# Patient Record
Sex: FEMALE | Race: WHITE | URBAN - METROPOLITAN AREA
[De-identification: names, ages, dates, MRNs, and addresses within clinical notes are randomized per-mention and may not be internally consistent; named-entity substitution may affect disease eponyms.]

---

## 2017-01-16 LAB — AMB DEXA, EXTERNAL: NORMAL

## 2018-03-28 LAB — MAMMOGRAPHY, EXTERNAL: NORMAL

## 2018-07-11 ENCOUNTER — OFFICE VISIT (OUTPATIENT)
Dept: FAMILY MEDICINE CLINIC | Age: 68
End: 2018-07-11

## 2018-07-11 VITALS
SYSTOLIC BLOOD PRESSURE: 122 MMHG | DIASTOLIC BLOOD PRESSURE: 72 MMHG | OXYGEN SATURATION: 96 % | WEIGHT: 151.6 LBS | HEIGHT: 66 IN | RESPIRATION RATE: 17 BRPM | BODY MASS INDEX: 24.36 KG/M2 | HEART RATE: 67 BPM | TEMPERATURE: 98 F

## 2018-07-11 DIAGNOSIS — E55.9 HYPOVITAMINOSIS D: ICD-10-CM

## 2018-07-11 DIAGNOSIS — C44.311 BASAL CELL CARCINOMA (BCC) OF SKIN OF NOSE: ICD-10-CM

## 2018-07-11 DIAGNOSIS — G20 PARKINSON DISEASE (HCC): Primary | Chronic | ICD-10-CM

## 2018-07-11 RX ORDER — CLONAZEPAM 0.5 MG/1
TABLET ORAL
COMMUNITY

## 2018-07-11 RX ORDER — FLUDROCORTISONE ACETATE 0.1 MG/1
0.2 TABLET ORAL DAILY
COMMUNITY
End: 2020-01-30 | Stop reason: ALTCHOICE

## 2018-07-11 RX ORDER — ONDANSETRON 4 MG/1
4 TABLET, FILM COATED ORAL
COMMUNITY
End: 2020-10-27 | Stop reason: ALTCHOICE

## 2018-07-11 RX ORDER — MIDODRINE HYDROCHLORIDE 10 MG/1
10 TABLET ORAL 3 TIMES DAILY
COMMUNITY
End: 2020-06-30 | Stop reason: DRUGHIGH

## 2018-07-11 RX ORDER — AMANTADINE HYDROCHLORIDE 100 MG/1
100 CAPSULE, GELATIN COATED ORAL 3 TIMES DAILY
COMMUNITY

## 2018-07-11 RX ORDER — ESCITALOPRAM OXALATE 10 MG/1
10 TABLET ORAL 2 TIMES DAILY
COMMUNITY
End: 2018-12-12

## 2018-07-11 RX ORDER — ASPIRIN 81 MG/1
TABLET ORAL DAILY
COMMUNITY
End: 2018-12-12

## 2018-07-11 RX ORDER — ERYTHROMYCIN 20 MG/G
GEL TOPICAL AS NEEDED
COMMUNITY
End: 2020-09-01 | Stop reason: ALTCHOICE

## 2018-07-11 RX ORDER — KETOCONAZOLE 20 MG/G
CREAM TOPICAL DAILY
COMMUNITY
End: 2020-09-01 | Stop reason: ALTCHOICE

## 2018-07-11 RX ORDER — ROPINIROLE 2 MG/1
3 TABLET, FILM COATED ORAL 3 TIMES DAILY
COMMUNITY

## 2018-07-11 NOTE — PATIENT INSTRUCTIONS

## 2018-07-11 NOTE — MR AVS SNAPSHOT
[address]  [address] [facility] [address] [phone] Patient: Iris Smith MRN: [id number] Visit Information Date & Time Provider Department Dept. Phone Encounter #  
 7/11/2018  9:30 AM Lety Jimenez [place] [phone] [id number] Upcoming Health Maintenance Date Due Hepatitis C Screening 1950 DTaP/Tdap/Td series (1 - Tdap) 9/8/1973 BREAST CANCER SCRN MAMMOGRAM 9/8/2002 FOBT Q 1 YEAR AGE 50-75 9/8/2002 ZOSTER VACCINE AGE 60> 7/8/2012 GLAUCOMA SCREENING Q2Y 9/8/2017 Bone Densitometry (Dexa) Screening 9/8/2017 Pneumococcal 65+ Low/Medium Risk (1 of 2 - PCV13) 9/8/2017 Influenza Age 5 to Adult 8/1/2018 Allergies as of 7/11/2018  Review Complete On: 7/11/2018 By: Nemo Alonso LPN Severity Noted Reaction Type Reactions Buspirone  07/11/2018    Rash Current Immunizations  Never Reviewed No immunizations on file. Not reviewed this visit You Were Diagnosed With   
  
 Codes Comments [facility] disease ([facility]    -  Primary ICD-10-CM: G20 
ICD-9-CM: 332.0 Basal cell carcinoma (BCC) of skin of nose     ICD-10-CM: C44.311 ICD-9-CM: 173.31 Hypovitaminosis D     ICD-10-CM: E55.9 ICD-9-CM: 268.9 Vitals BP Pulse Temp Resp Height(growth percentile) Weight(growth percentile) 122/72 (BP 1 Location: Left arm, BP Patient Position: Sitting) 67 98 °F (36.7 °C) (Oral) 17 5' 6\" (1.676 m) 151 lb 9.6 oz (68.8 kg) SpO2 BMI OB Status Smoking Status 96% 24.47 kg/m2 Hysterectomy Former Smoker BMI and BSA Data Body Mass Index Body Surface Area  
 24.47 kg/m 2 1.79 m 2 Preferred Pharmacy Pharmacy Name Phone [facility] [address] [facility], [facility] [place] [phone] Your Updated Medication List  
  
   
 This list is accurate as of 7/11/18 10:26 AM.  Always use your most recent med list.  
  
  
  
  
 ALIGN 4 mg Cap Generic drug:  Bifidobacterium Infantis Take  by mouth daily. amantadine HCl 100 mg capsule Commonly known as:  Corey Alberta Take 100 mg by mouth three (3) times daily. aspirin delayed-release 81 mg tablet Take  by mouth daily. clonazePAM 0.5 mg tablet Commonly known as:  Elta Halsted Take  by mouth nightly as needed. erythromycin with ethanol 2 % topical gel Commonly known as:  ERYGEL Apply  to affected area as needed. escitalopram oxalate 10 mg tablet Commonly known as:  Arther Putty Take 10 mg by mouth two (2) times a day. fludrocortisone 0.1 mg tablet Commonly known as:  FLORINEF Take  by mouth daily. ketoconazole 2 % topical cream  
Commonly known as:  NIZORAL Apply  to affected area daily. midodrine 10 mg tablet Commonly known as:  Rickie Croon Take  by mouth three (3) times daily. rOPINIRole 2 mg tablet Commonly known as:  [place] Peat Take 2 mg by mouth three (3) times daily. ZOFRAN 4 mg tablet Generic drug:  ondansetron hcl Take 4 mg by mouth every eight (8) hours as needed for Nausea. We Performed the Following  DEXA SCAN [facility] Comments: This external order was created through the Results Console.  MAMMOGRAPHY [[facility] Custom] Comments: This external order was created through the Results Console. Patient Instructions Learning About Vitamin D Why is it important to get enough vitamin D? Your body needs vitamin D to absorb calcium. Calcium keeps your bones and muscles, including your heart, healthy and strong. If your muscles don't get enough calcium, they can cramp, hurt, or feel weak. You may have long-term (chronic) muscle aches and pains.  
If you don't get enough vitamin D throughout life, you have an increased chance of having thin and brittle bones (osteoporosis) in your later years. Children who don't get enough vitamin D may not grow as much as others their age. They also have a chance of getting a rare disease called rickets. It causes weak bones. Vitamin D and calcium are added to many foods. And your body uses sunshine to make its own vitamin D. How much vitamin D do you need? The [employer] recommends that people ages 3 through 79 get 600 IU (international units) every day. Adults 71 and older need 800 IU every day. Blood tests for vitamin D can check your vitamin D level. But there is no standard normal range used by all laboratories. The [employer] recommends a blood level of 20 ng/mL of vitamin D for healthy bones. And most people in the [place] and [facility]) meet this goal. 
How can you get more vitamin D? Foods that contain vitamin D include: 
· [place], tuna, and mackerel. These are some of the best foods to eat when you need to get more vitamin D. 
· Cheese, egg yolks, and beef liver. These foods have vitamin D in small amounts. · Milk, soy drinks, orange juice, yogurt, margarine, and some kinds of cereal have vitamin D added to them. Some people don't make vitamin D as well as others. They may have to take extra care in getting enough vitamin D. Things that reduce how much vitamin D your body makes include: · Dark skin, such as many  Americans have. · Age, especially if you are older than 72. · Digestive problems, such as Crohn's or celiac disease. · Liver and kidney disease. Some people who do not get enough vitamin D may need supplements. Are there any risks from taking vitamin D? 
· Too much vitamin D: 
¨ Can damage your kidneys. ¨ Can cause nausea and vomiting, constipation, and weakness. ¨ Raises the amount of calcium in your blood. If this happens, you can get confused or have an irregular heart rhythm. · Vitamin D may interact with other medicines. Tell your doctor about all of the medicines you take, including over-the-counter drugs, herbs, and pills. Tell your doctor about all of your current medical problems. Where can you learn more? Go to http://alberto.info/. Enter [id number] in the search box to learn more about \"Learning About Vitamin D.\" 
Current as of: May 12, 2017 Content Version: 11.7 © 3057-8837 [employer]. Care instructions adapted under license by [employer] (which disclaims liability or warranty for this information). If you have questions about a medical condition or this instruction, always ask your healthcare professional. [address] any warranty or liability for your use of this information. Introducing [facility] [place] [facility]! Mervin Mandel introduces Proviation patient portal. Now you can access parts of your medical record, email your doctor's office, and request medication refills online. 1. In your internet browser, go to [email or web address] 2. Click on the First Time User? Click Here link in the Sign In box. You will see the New Member Sign Up page. 3. Enter your Proviation Access Code exactly as it appears below. You will not need to use this code after youve completed the sign-up process. If you do not sign up before the expiration date, you must request a new code. · Proviation Access Code: [email or web address] Expires: 10/9/2018 10:26 AM 
 
4. Enter the last four digits of your Social Security Number (xxxx) and Date of Birth (mm/dd/yyyy) as indicated and click Submit. You will be taken to the next sign-up page. 5. Create a Proviation ID. This will be your Proviation login ID and cannot be changed, so think of one that is secure and easy to remember. 6. Create a Proviation password. You can change your password at any time. 7. Enter your Password Reset Question and Answer.  This can be used at a later time if you forget your password. 8. Enter your e-mail address. You will receive e-mail notification when new information is available in [address] 9. Click Sign Up. You can now view and download portions of your medical record. 10. Click the Download Summary menu link to download a portable copy of your medical information. If you have questions, please visit the Frequently Asked Questions section of the Bean[employer] website. Remember, Bean[employer] is NOT to be used for urgent needs. For medical emergencies, dial 911. Now available from your iPhone and Android! Please provide this summary of care documentation to your next provider. Your primary care clinician is listed as Patience 51. If you have any questions after today's visit, please call [phone].

## 2018-07-11 NOTE — PROGRESS NOTES
Iris Smith is a 74 y.o. female (: 1950) presenting to address:    No chief complaint on file. Vitals:    18 0944   BP: 122/72   Pulse: 67   Resp: 17   Temp: 98 °F (36.7 °C)   TempSrc: Oral   SpO2: 96%   Weight: 151 lb 9.6 oz (68.8 kg)   Height: 5' 6\" (1.676 m)   PainSc:   0 - No pain       Hearing/Vision:      Visual Acuity Screening    Right eye Left eye Both eyes   Without correction: 20/50 20/70 20/50   With correction:          Learning Assessment:   No flowsheet data found. Depression Screening:     PHQ over the last two weeks 2018   Little interest or pleasure in doing things More than half the days   Feeling down, depressed or hopeless Several days   Total Score PHQ 2 3     Fall Risk Assessment:     Fall Risk Assessment, last 12 mths 2018   Able to walk? Yes   Fall in past 12 months? No     Abuse Screening:     Abuse Screening Questionnaire 2018   Do you ever feel afraid of your partner? N   Are you in a relationship with someone who physically or mentally threatens you? N   Is it safe for you to go home? Y     Coordination of Care Questionaire:   1. Have you been to the ER, urgent care clinic since your last visit? Hospitalized since your last visit? NO    2. Have you seen or consulted any other health care providers outside of the [facility] since your last visit? Include any pap smears or colon screening. NO    Advanced Directive:   1. Do you have an Advanced Directive? NO    2. Would you like information on Advanced Directives?  NO

## 2018-07-11 NOTE — PROGRESS NOTES
Assessment/Plan:    *Diagnoses and all orders for this visit:    1. Parkinson disease ([address]    2. Basal cell carcinoma (BCC) of skin of nose    3. Hypovitaminosis D        She will return next spring for her annual PE. Will come sooner if needed. Reminded her of Flu vaccines in Sept.    The plan was discussed with the patient. The patient verbalized understanding and is in agreement with the plan. All medication potential side effects were discussed with the patient.    -------------------------------------------------------------------------------------------------------------------        Alpha More is a 74 y.o. female and presents with Establish Care         Subjective:  Pt here to establish with a new PCP. Was previously seeing Dr. Jana Vargas at [facility]. Hx of parkinson disease, has seen Dr. Josi Wade for many years. The Requip, Amantadine, Lexapro, Klonopin, midodrine and fludrocortisone are all prescribed by him. Her BP has typically been so low that he added midodrine and fludrocortisone. Has issues with depression and anxiety so thus the lexapro and klonopin. Dx this year with basal cell cc. Had Mohs procedure. Had labs done earlier this year. Vit D was low. Has started an OTC supplement. ROS:  Constitutional: No recent weight change. No weakness/fatigue. No f/c. Skin: No rashes, change in nails/hair, itching   HENT: No HA, dizziness. No hearing loss/tinnitus. No nasal congestion/discharge. Eyes: No change in vision, double/blurred vision or eye pain/redness. Cardiovascular: No CP/palpitations. No BANKS/orthopnea/PND. Respiratory: No cough/sputum, dyspnea, wheezing. Gastointestinal: No dysphagia, reflux. No n/v. No constipation/diarrhea. No melena/rectal bleeding. Genitourinary: No dysuria, urinary hesitancy, nocturia, hematuria. No incontinence. Musculoskeletal: No joint pain/stiffness. No muscle pain/tenderness.    Endo: No heat/cold intolerance, no polyuria/polydypsia. Heme: No h/o anemia. No easy bleeding/bruising. Allergy/Immunology: No seasonal rhinitis. Denies frequent colds, sinus/ear infections. Neurological: No seizures/numbness/weakness. No paresthesias. Psychiatric:  No depression, anxiety. The problem list was updated as a part of today's visit. Patient Active Problem List   Diagnosis Code    Parkinson disease ([facility] G20    Basal cell carcinoma (BCC) of skin of nose C44.311    Hypovitaminosis D E55.9       The PSH, FH were reviewed. SH:  Social History   Substance Use Topics    Smoking status: Former Smoker     Types: Cigarettes     Quit date: 7/1/1998    Smokeless tobacco: Never Used    Alcohol use No       Medications/Allergies:  No current outpatient prescriptions on file prior to visit. No current facility-administered medications on file prior to visit. Allergies   Allergen Reactions    Buspirone Rash         Health Maintenance:   Health Maintenance   Topic Date Due    Hepatitis C Screening  1950    DTaP/Tdap/Td series (1 - Tdap) 09/08/1973    FOBT Q 1 YEAR AGE 50-75  09/08/2002    ZOSTER VACCINE AGE 60>  07/08/2012    GLAUCOMA SCREENING Q2Y  09/08/2017    Pneumococcal 65+ Low/Medium Risk (1 of 2 - PCV13) 09/08/2017    MEDICARE YEARLY EXAM  07/11/2018    Influenza Age 5 to Adult  08/01/2018    BREAST CANCER SCRN MAMMOGRAM  03/28/2020    Bone Densitometry (Dexa) Screening  Completed       Objective:  Visit Vitals    /72 (BP 1 Location: Left arm, BP Patient Position: Sitting)    Pulse 67    Temp 98 °F (36.7 °C) (Oral)    Resp 17    Ht 5' 6\" (1.676 m)    Wt 151 lb 9.6 oz (68.8 kg)    SpO2 96%    BMI 24.47 kg/m2          Nurses notes and VS reviewed.       Physical Examination: General appearance - alert, well appearing, and in no distress  Chest - clear to auscultation, no wheezes, rales or rhonchi, symmetric air entry  Heart - normal rate, regular rhythm, normal S1, S2, no murmurs, rubs, clicks or gallops  Abdomen - soft, nontender, nondistended, no masses or organomegaly  Neuro - resting tremors        Labwork and Ancillary Studies:    CBC w/Diff  No results found for: WBC, WBCLT, HGB, HGBP, PLT, HGBEXT, PLTEXT      Basic Metabolic Profile  No results found for: NA, K, CL, CO2, AGAP, GLU, BUN, CREA, BUCR, GFRAA, GFRNA, CA, GFRAA      LFT  No results found for: GPT, ALT, SGOT, GGT, GGTP, AP, APIT, APX, CBIL, TBIL, TBILI      Cholesterol  No results found for: CHOL, CHOLX, CHLST, CHOLV, HDL, LDL, LDLC, DLDLP, TGLX, TRIGL, TRIGP, CHHD, CHHDX

## 2018-11-08 ENCOUNTER — OFFICE VISIT (OUTPATIENT)
Dept: FAMILY MEDICINE CLINIC | Age: 68
End: 2018-11-08

## 2018-11-08 VITALS
OXYGEN SATURATION: 97 % | RESPIRATION RATE: 18 BRPM | HEART RATE: 68 BPM | SYSTOLIC BLOOD PRESSURE: 108 MMHG | DIASTOLIC BLOOD PRESSURE: 60 MMHG | BODY MASS INDEX: 24.46 KG/M2 | WEIGHT: 152.2 LBS | HEIGHT: 66 IN | TEMPERATURE: 97.7 F

## 2018-11-08 DIAGNOSIS — M25.521 RIGHT ELBOW PAIN: Primary | ICD-10-CM

## 2018-11-08 RX ORDER — PYRIDOSTIGMINE BROMIDE 30 MG/1
30 TABLET ORAL 3 TIMES DAILY
COMMUNITY

## 2018-11-08 NOTE — PROGRESS NOTES
Assessment/Plan:    *Diagnoses and all orders for this visit:    1. Right elbow pain  -     XR ELBOW RT MIN 3 V; Future      Her stopping the ASA may be why she is feeling inflammation in her body like this issue. Will await Xray results. Pt will continue to monitor it for now since the pain is intermittent. Suggested she take some stress off that arm in her daily activities. Will contact me if pain gets worse. Suspecting tendinitis and will refer to Sports Med if needed. For now, she will use Ibuprofen as needed. She does not want to take it every day. The plan was discussed with the patient. The patient verbalized understanding and is in agreement with the plan. All medication potential side effects were discussed with the patient.    -------------------------------------------------------------------------------------------------------------------        Isha Rodriguez is a 79 y.o. female and presents with Elbow Pain (Right)         Subjective:  Pt here for RT elbow pain that is intermittent issue, 4 weeks now. No preceding trauma, no past issue with the elbow, is left hand dominant. Has not noticed restrictions in movement or in her activities. When thinking about this, she feels she may be using this arm a lot more than she has typically done in the past and may have strained something. Also of note, she stopped using her daily ASA about 2-3 weeks ago. ROS:  Review of Systems - Negative except as above. The problem list was updated as a part of today's visit. Patient Active Problem List   Diagnosis Code    Parkinson disease ([facility] G20    Basal cell carcinoma (BCC) of skin of nose C44.311    Hypovitaminosis D E55.9       The PSH, FH were reviewed.         SH:  Social History     Tobacco Use    Smoking status: Former Smoker     Types: Cigarettes     Last attempt to quit: 1998     Years since quittin.3    Smokeless tobacco: Never Used   Substance Use Topics    Alcohol use: No    Drug use: No       Medications/Allergies:  Current Outpatient Medications on File Prior to Visit   Medication Sig Dispense Refill    pyridostigmine (MESTINON) 60 mg tablet Take 30 mg by mouth four (4) times daily.  escitalopram oxalate (LEXAPRO) 10 mg tablet Take 10 mg by mouth two (2) times a day.  clonazePAM (KLONOPIN) 0.5 mg tablet Take  by mouth nightly as needed.  amantadine HCl (SYMMETREL) 100 mg capsule Take 100 mg by mouth three (3) times daily.  rOPINIRole (REQUIP) 2 mg tablet Take 2 mg by mouth three (3) times daily.  midodrine (PROAMITINE) 10 mg tablet Take  by mouth three (3) times daily.  fludrocortisone (FLORINEF) 0.1 mg tablet Take 0.2 mg by mouth daily.  Bifidobacterium Infantis (ALIGN) 4 mg cap Take  by mouth daily.  ondansetron hcl (ZOFRAN) 4 mg tablet Take 4 mg by mouth every eight (8) hours as needed for Nausea.  erythromycin with ethanol (ERYGEL) 2 % topical gel Apply  to affected area as needed.  ketoconazole (NIZORAL) 2 % topical cream Apply  to affected area daily.  aspirin delayed-release 81 mg tablet Take  by mouth daily. No current facility-administered medications on file prior to visit.          Allergies   Allergen Reactions    Buspirone Rash    Wellbutrin [Bupropion Hcl] Rash and Itching         Health Maintenance:   Health Maintenance   Topic Date Due    Hepatitis C Screening  1950    DTaP/Tdap/Td series (1 - Tdap) 09/08/1973    Shingrix Vaccine Age 50> (1 of 2) 09/08/2002    GLAUCOMA SCREENING Q2Y  09/08/2017    MEDICARE YEARLY EXAM  07/11/2018    BREAST CANCER SCRN MAMMOGRAM  03/28/2020    COLONOSCOPY  10/20/2020    Pneumococcal 65+ Low/Medium Risk (2 of 2 - PPSV23) 12/23/2021    Bone Densitometry (Dexa) Screening  Completed    Influenza Age 9 to Adult  Completed       Objective:  Visit Vitals  /60 (BP 1 Location: Left arm, BP Patient Position: Sitting)   Pulse 68   Temp 97.7 °F (36.5 °C) (Oral)   Resp 18   Ht 5' 6\" (1.676 m)   Wt 152 lb 3.2 oz (69 kg)   SpO2 97%   BMI 24.57 kg/m²          Nurses notes and VS reviewed. Physical Examination: General appearance - alert, well appearing, and in no distress  Musculoskeletal - no joint tenderness, deformity or swelling, no pain with flexion or extension.         Labwork and Ancillary Studies:    CBC w/Diff  No results found for: WBC, WBCLT, HGB, HGBP, PLT, HGBEXT, PLTEXT, HGBEXT, PLTEXT      Basic Metabolic Profile  No results found for: NA, K, CL, CO2, AGAP, GLU, BUN, CREA, BUCR, GFRAA, GFRNA, CA, GFRAA      LFT  No results found for: GPT, ALT, SGOT, GGT, GGTP, AP, APIT, APX, CBIL, TBIL, TBILI      Cholesterol  No results found for: CHOL, CHOLX, CHLST, CHOLV, HDL, LDL, LDLC, DLDLP, TGLX, TRIGL, TRIGP, CHHD, CHHDX

## 2018-11-08 NOTE — PROGRESS NOTES
Itzel Anthony is a 79 y.o. female (: 1950) presenting to address: Has been experiencing right elbow pain for the past 3 to 4 weeks. She has tried using Ibuprofen for the pain, with moderate relief. Patient has already received the flu vaccine. Chief Complaint   Patient presents with    Elbow Pain     Right       Vitals:    18 1102   BP: 108/60   Pulse: 68   Resp: 18   Temp: 97.7 °F (36.5 °C)   TempSrc: Oral   SpO2: 97%   Weight: 152 lb 3.2 oz (69 kg)   Height: 5' 6\" (1.676 m)   PainSc:   0 - No pain       Hearing/Vision:   No exam data present    Learning Assessment:   No flowsheet data found. Depression Screening:     PHQ over the last two weeks 2018   Little interest or pleasure in doing things More than half the days   Feeling down, depressed, irritable, or hopeless Several days   Total Score PHQ 2 3     Fall Risk Assessment:     Fall Risk Assessment, last 12 mths 2018   Able to walk? Yes   Fall in past 12 months? No     Abuse Screening:     Abuse Screening Questionnaire 2018   Do you ever feel afraid of your partner? N   Are you in a relationship with someone who physically or mentally threatens you? N   Is it safe for you to go home? Y     Coordination of Care Questionaire:   1. Have you been to the ER, urgent care clinic since your last visit? Hospitalized since your last visit? NO    2. Have you seen or consulted any other health care providers outside of the [address] [place] [address] since your last visit? Include any pap smears or colon screening. YES Neurologist on 18; Dermatologist not sure exactly when. Advanced Directive:   1. Do you have an Advanced Directive? YES    2. Would you like information on Advanced Directives?  NO

## 2018-11-08 NOTE — PATIENT INSTRUCTIONS
Elbow: Exercises  Your Care Instructions  Here are some examples of exercises for elbows. Start each exercise slowly. Ease off the exercise if you start to have pain. Your doctor or physical therapist will tell you when you can start these exercises and which ones will work best for you. How to do the exercises  Wrist flexor stretch    1. Extend your arm in front of you with your palm up. 2. Bend your wrist, pointing your hand toward the floor. 3. With your other hand, gently bend your wrist farther until you feel a mild to moderate stretch in your forearm. 4. Hold for at least 15 to 30 seconds. Repeat 2 to 4 times. Wrist extensor stretch    1. Repeat steps 1 to 4 of the stretch above but begin with your extended hand palm down. Ball or sock squeeze    1. Hold a tennis ball (or a rolled-up sock) in your hand. 2. Make a fist around the ball (or sock) and squeeze. 3. Hold for about 6 seconds, and then relax for up to 10 seconds. 4. Repeat 8 to 12 times. 5. Switch the ball (or sock) to your other hand and do 8 to 12 times. Wrist deviation    1. Sit so that your arm is supported but your hand hangs off the edge of a flat surface, such as a table. 2. Hold your hand out like you are shaking hands with someone. 3. Move your hand up and down. 4. Repeat this motion 8 to 12 times. 5. Switch arms. 6. Try to do this exercise twice with each hand. Wrist curls    1. Place your forearm on a table with your hand hanging over the edge of the table, palm up. 2. Place a 1- to 2-pound weight in your hand. This may be a dumbbell, a can of food, or a filled water bottle. 3. Slowly raise and lower the weight while keeping your forearm on the table and your palm facing up. 4. Repeat this motion 8 to 12 times. 5. Switch arms, and do steps 1 through 4.  6. Repeat with your hand facing down toward the floor. Switch arms. Biceps curls    1.  Sit leaning forward with your legs slightly spread and your left hand on your left thigh. 2. Place your right elbow on your right thigh, and hold the weight with your forearm horizontal.  3. Slowly curl the weight up and toward your chest.  4. Repeat this motion 8 to 12 times. 5. Switch arms, and do steps 1 through 4. Follow-up care is a key part of your treatment and safety. Be sure to make and go to all appointments, and call your doctor if you are having problems. It's also a good idea to know your test results and keep a list of the medicines you take. Where can you learn more? Go to http://heidy-daniel.info/. Enter M279 in the search box to learn more about \"Elbow: Exercises. \"  Current as of: November 29, 2017  Content Version: 11.8  © 1204-0718 [employer]. Care instructions adapted under license by [employer] (which disclaims liability or warranty for this information). If you have questions about a medical condition or this instruction, always ask your healthcare professional. [address] any warranty or liability for your use of this information.

## 2018-12-12 ENCOUNTER — OFFICE VISIT (OUTPATIENT)
Dept: FAMILY MEDICINE CLINIC | Age: 68
End: 2018-12-12

## 2018-12-12 VITALS
DIASTOLIC BLOOD PRESSURE: 70 MMHG | RESPIRATION RATE: 16 BRPM | WEIGHT: 154 LBS | BODY MASS INDEX: 24.75 KG/M2 | SYSTOLIC BLOOD PRESSURE: 136 MMHG | HEIGHT: 66 IN | HEART RATE: 64 BPM | TEMPERATURE: 97.9 F | OXYGEN SATURATION: 97 %

## 2018-12-12 DIAGNOSIS — Z78.0 POSTMENOPAUSAL: ICD-10-CM

## 2018-12-12 DIAGNOSIS — Z13.6 SCREENING FOR HYPERTENSION: ICD-10-CM

## 2018-12-12 DIAGNOSIS — Z00.00 MEDICARE ANNUAL WELLNESS VISIT, SUBSEQUENT: Primary | ICD-10-CM

## 2018-12-12 DIAGNOSIS — R53.83 FATIGUE, UNSPECIFIED TYPE: ICD-10-CM

## 2018-12-12 DIAGNOSIS — G20 PARKINSON DISEASE (HCC): Chronic | ICD-10-CM

## 2018-12-12 DIAGNOSIS — Z12.31 ENCOUNTER FOR SCREENING MAMMOGRAM FOR MALIGNANT NEOPLASM OF BREAST: ICD-10-CM

## 2018-12-12 DIAGNOSIS — E55.9 HYPOVITAMINOSIS D: ICD-10-CM

## 2018-12-12 RX ORDER — CITALOPRAM 20 MG/1
10 TABLET, FILM COATED ORAL DAILY
COMMUNITY

## 2018-12-12 NOTE — PATIENT INSTRUCTIONS
Medicare Wellness Visit, Female     The best way to live healthy is to have a lifestyle where you eat a well-balanced diet, exercise regularly, limit alcohol use, and quit all forms of tobacco/nicotine, if applicable. Regular preventive services are another way to keep healthy. Preventive services (vaccines, screening tests, monitoring & exams) can help personalize your care plan, which helps you manage your own care. Screening tests can find health problems at the earliest stages, when they are easiest to treat. [facility] follows the current, evidence-based guidelines published by the [facility] Jenny ([facility]) when recommending preventive services for our patients. Because we follow these guidelines, sometimes recommendations change over time as research supports it. (For example, mammograms used to be recommended annually. Even though Medicare will still pay for an annual mammogram, the newer guidelines recommend a mammogram every two years for women of average risk.)  Of course, you and your doctor may decide to screen more often for some diseases, based on your risk and your health status. Preventive services for you include:  - Medicare offers their members a free annual wellness visit, which is time for you and your primary care provider to discuss and plan for your preventive service needs. Take advantage of this benefit every year!  -All adults over the age of 72 should receive the recommended pneumonia vaccines. Current USPSTF guidelines recommend a series of two vaccines for the best pneumonia protection.   -All adults should have a flu vaccine yearly and a tetanus vaccine every 10 years. All adults age 61 and older should receive a shingles vaccine once in their lifetime.    -A bone mass density test is recommended when a woman turns 65 to screen for osteoporosis. This test is only recommended one time, as a screening.  Some providers will use this same test as a disease monitoring tool if you already have osteoporosis. -All adults age 38-68 who are overweight should have a diabetes screening test once every three years.   -Other screening tests and preventive services for persons with diabetes include: an eye exam to screen for diabetic retinopathy, a kidney function test, a foot exam, and stricter control over your cholesterol.   -Cardiovascular screening for adults with routine risk involves an electrocardiogram (ECG) at intervals determined by your doctor.   -Colorectal cancer screenings should be done for adults age 54-65 with no increased risk factors for colorectal cancer. There are a number of acceptable methods of screening for this type of cancer. Each test has its own benefits and drawbacks. Discuss with your doctor what is most appropriate for you during your annual wellness visit. The different tests include: colonoscopy (considered the best screening method), a fecal occult blood test, a fecal DNA test, and sigmoidoscopy. -Breast cancer screenings are recommended every other year for women of normal risk, age 54-69.  -Cervical cancer screenings for women over age 72 are only recommended with certain risk factors.   -All adults born between [facility] should be screened once for Hepatitis C.      Here is a list of your current Health Maintenance items (your personalized list of preventive services) with a due date:  Health Maintenance Due   Topic Date Due    Hepatitis C Test  1950    DTaP/Tdap/Td  (1 - Tdap) 09/08/1973    Shingles Vaccine (1 of 2) 09/08/2002    Annual Well Visit  07/11/2018

## 2018-12-12 NOTE — PROGRESS NOTES
This is the Subsequent Medicare Annual Wellness Exam, performed 12 months or more after the Initial AWV or the last Subsequent AWV    I have reviewed the patient's medical history in detail and updated the computerized patient record. History     Past Medical History:   Diagnosis Date    Basal cell carcinoma (BCC) of skin of nose 2018    Hypovitaminosis D 2018    Parkinson disease ([facility] [address] 2014      No past surgical history on file. Current Outpatient Medications   Medication Sig Dispense Refill    citalopram (CELEXA) 20 mg tablet Take  by mouth daily.  pyridostigmine (MESTINON) 60 mg tablet Take 30 mg by mouth four (4) times daily.  clonazePAM (KLONOPIN) 0.5 mg tablet Take  by mouth nightly as needed.  amantadine HCl (SYMMETREL) 100 mg capsule Take 100 mg by mouth three (3) times daily.  rOPINIRole (REQUIP) 2 mg tablet Take 2 mg by mouth three (3) times daily.  midodrine (PROAMITINE) 10 mg tablet Take  by mouth three (3) times daily.  fludrocortisone (FLORINEF) 0.1 mg tablet Take 0.2 mg by mouth daily.  ondansetron hcl (ZOFRAN) 4 mg tablet Take 4 mg by mouth every eight (8) hours as needed for Nausea.  erythromycin with ethanol (ERYGEL) 2 % topical gel Apply  to affected area as needed.  ketoconazole (NIZORAL) 2 % topical cream Apply  to affected area daily. Allergies   Allergen Reactions    Buspirone Rash    Wellbutrin [Bupropion Hcl] Rash and Itching     No family history on file.   Social History     Tobacco Use    Smoking status: Former Smoker     Types: Cigarettes     Last attempt to quit: 1998     Years since quittin.4    Smokeless tobacco: Never Used   Substance Use Topics    Alcohol use: No     Patient Active Problem List   Diagnosis Code    Parkinson disease ([facility] [address] G20    Basal cell carcinoma (BCC) of skin of nose C44.311    Hypovitaminosis D E55.9       Depression Risk Factor Screening:     PHQ over the last two weeks 12/12/2018   Little interest or pleasure in doing things Several days   Feeling down, depressed, irritable, or hopeless Several days   Total Score PHQ 2 2     Alcohol Risk Factor Screening: You do not drink alcohol or very rarely. Functional Ability and Level of Safety:   Hearing Loss  Hearing is good. Activities of Daily Living  The home contains: no safety equipment. Patient does total self care    Fall Risk  Fall Risk Assessment, last 12 mths 12/12/2018   Able to walk? Yes   Fall in past 12 months? Yes   Fall with injury? No   Number of falls in past 12 months 1   Fall Risk Score 1       Abuse Screen  Patient is not abused    Cognitive Screening   Evaluation of Cognitive Function:  Has your family/caregiver stated any concerns about your memory: no  Normal    Patient Care Team   Patient Care Team:  Katerin James MD as PCP - General (Internal Medicine)  Valentina Corcoran MD as Physician (Neurology)    Assessment/Plan   Education and counseling provided:  Are appropriate based on today's review and evaluation    Diagnoses and all orders for this visit:    1. Medicare annual wellness visit, subsequent    2. Postmenopausal  -     DEXA BONE DENSITY STUDY AXIAL; Future    3. Encounter for screening mammogram for malignant neoplasm of breast  -     THERESA MAMMO BI SCREENING INCL CAD; Future    4. Hypovitaminosis D  -     VITAMIN D, 25 HYDROXY; Future    5. Parkinson disease ([facility] [address]  -     CBC WITH AUTOMATED DIFF; Future  -     HEPATIC FUNCTION PANEL; Future  -     METABOLIC PANEL, BASIC; Future  -     URINALYSIS W/ RFLX MICROSCOPIC; Future    6. Screening for hypertension  -     LIPID PANEL; Future    7. Fatigue, unspecified type  -     TSH 3RD GENERATION; Future  -     T4, FREE;  Future        Health Maintenance Due   Topic Date Due    Hepatitis C Screening  1950    DTaP/Tdap/Td series (1 - Tdap) 09/08/1973    Shingrix Vaccine Age 50> (1 of 2) 09/08/2002    MEDICARE YEARLY EXAM  07/11/2018

## 2018-12-12 NOTE — PROGRESS NOTES
Crystal Thomson is a 79 y.o. female (: 1950) presenting to address:    Chief Complaint   Patient presents with    Annual Wellness Visit       Vitals:    18 1017   BP: 136/70   Pulse: 64   Resp: 16   Temp: 97.9 °F (36.6 °C)   TempSrc: Oral   SpO2: 97%   Weight: 154 lb (69.9 kg)   Height: 5' 6\" (1.676 m)   PainSc:   0 - No pain       Hearing/Vision:      Visual Acuity Screening    Right eye Left eye Both eyes   Without correction: 20/70 20/40 20/40   With correction:          Learning Assessment:   No flowsheet data found. Depression Screening:     PHQ over the last two weeks 2018   Little interest or pleasure in doing things Several days   Feeling down, depressed, irritable, or hopeless Several days   Total Score PHQ 2 2     Fall Risk Assessment:     Fall Risk Assessment, last 12 mths 2018   Able to walk? Yes   Fall in past 12 months? Yes   Fall with injury? No   Number of falls in past 12 months 1   Fall Risk Score 1     Abuse Screening:     Abuse Screening Questionnaire 2018   Do you ever feel afraid of your partner? N   Are you in a relationship with someone who physically or mentally threatens you? N   Is it safe for you to go home? Y     Coordination of Care Questionaire:   1. Have you been to the ER, urgent care clinic since your last visit? Hospitalized since your last visit? NO    2. Have you seen or consulted any other health care providers outside of the [address] [place] [address] since your last visit? Include any pap smears or colon screening. YES dermatology, neurology    Advanced Directive:   1. Do you have an Advanced Directive? NO    2. Would you like information on Advanced Directives?  NO

## 2018-12-13 ENCOUNTER — HOSPITAL ENCOUNTER (OUTPATIENT)
Dept: LAB | Age: 68
Discharge: HOME | End: 2018-12-13
Payer: MEDICARE

## 2018-12-13 DIAGNOSIS — G20 PARKINSON DISEASE (HCC): Chronic | ICD-10-CM

## 2018-12-13 DIAGNOSIS — R53.83 FATIGUE, UNSPECIFIED TYPE: ICD-10-CM

## 2018-12-13 DIAGNOSIS — Z13.6 SCREENING FOR HYPERTENSION: ICD-10-CM

## 2018-12-13 DIAGNOSIS — E55.9 HYPOVITAMINOSIS D: ICD-10-CM

## 2018-12-13 LAB
ALBUMIN SERPL-MCNC: 4.2 G/DL (ref 3.4–5)
ALBUMIN/GLOB SERPL: 1.6 {RATIO} (ref 0.8–1.7)
ALP SERPL-CCNC: 102 U/L (ref 45–117)
ALT SERPL-CCNC: 31 U/L (ref 13–56)
ANION GAP SERPL CALC-SCNC: 6 MMOL/L (ref 3–18)
APPEARANCE UR: CLEAR
AST SERPL-CCNC: 16 U/L (ref 15–37)
BASOPHILS # BLD: 0 K/UL (ref 0–0.1)
BASOPHILS NFR BLD: 1 % (ref 0–2)
BILIRUB DIRECT SERPL-MCNC: 0.2 MG/DL (ref 0–0.2)
BILIRUB SERPL-MCNC: 0.8 MG/DL (ref 0.2–1)
BILIRUB UR QL: NEGATIVE
BUN SERPL-MCNC: 10 MG/DL (ref 7–18)
BUN/CREAT SERPL: 16 (ref 12–20)
CALCIUM SERPL-MCNC: 8.9 MG/DL (ref 8.5–10.1)
CHLORIDE SERPL-SCNC: 107 MMOL/L (ref 100–108)
CHOLEST SERPL-MCNC: 170 MG/DL
CO2 SERPL-SCNC: 29 MMOL/L (ref 21–32)
COLOR UR: YELLOW
CREAT SERPL-MCNC: 0.62 MG/DL (ref 0.6–1.3)
DIFFERENTIAL METHOD BLD: ABNORMAL
EOSINOPHIL # BLD: 0.1 K/UL (ref 0–0.4)
EOSINOPHIL NFR BLD: 1 % (ref 0–5)
ERYTHROCYTE [DISTWIDTH] IN BLOOD BY AUTOMATED COUNT: 13.4 % (ref 11.6–14.5)
GLOBULIN SER CALC-MCNC: 2.6 G/DL (ref 2–4)
GLUCOSE SERPL-MCNC: 81 MG/DL (ref 74–99)
GLUCOSE UR STRIP.AUTO-MCNC: NEGATIVE MG/DL
HCT VFR BLD AUTO: 40.7 % (ref 35–45)
HDLC SERPL-MCNC: 75 MG/DL (ref 40–60)
HDLC SERPL: 2.3 {RATIO} (ref 0–5)
HGB BLD-MCNC: 12.7 G/DL (ref 12–16)
HGB UR QL STRIP: NEGATIVE
KETONES UR QL STRIP.AUTO: NEGATIVE MG/DL
LDLC SERPL CALC-MCNC: 85 MG/DL (ref 0–100)
LEUKOCYTE ESTERASE UR QL STRIP.AUTO: NEGATIVE
LIPID PROFILE,FLP: ABNORMAL
LYMPHOCYTES # BLD: 1.2 K/UL (ref 0.9–3.6)
LYMPHOCYTES NFR BLD: 36 % (ref 21–52)
MCH RBC QN AUTO: 32.2 PG (ref 24–34)
MCHC RBC AUTO-ENTMCNC: 31.2 G/DL (ref 31–37)
MCV RBC AUTO: 103.3 FL (ref 74–97)
MONOCYTES # BLD: 0.3 K/UL (ref 0.05–1.2)
MONOCYTES NFR BLD: 9 % (ref 3–10)
NEUTS SEG # BLD: 1.8 K/UL (ref 1.8–8)
NEUTS SEG NFR BLD: 53 % (ref 40–73)
NITRITE UR QL STRIP.AUTO: NEGATIVE
PH UR STRIP: >8.5 [PH] (ref 5–8)
PLATELET # BLD AUTO: 176 K/UL (ref 135–420)
PMV BLD AUTO: 10.4 FL (ref 9.2–11.8)
POTASSIUM SERPL-SCNC: 3.8 MMOL/L (ref 3.5–5.5)
PROT SERPL-MCNC: 6.8 G/DL (ref 6.4–8.2)
PROT UR STRIP-MCNC: NEGATIVE MG/DL
RBC # BLD AUTO: 3.94 M/UL (ref 4.2–5.3)
SODIUM SERPL-SCNC: 142 MMOL/L (ref 136–145)
SP GR UR REFRACTOMETRY: 1.01 (ref 1–1.03)
T4 FREE SERPL-MCNC: 0.9 NG/DL (ref 0.7–1.5)
TRIGL SERPL-MCNC: 50 MG/DL (ref ?–150)
TSH SERPL DL<=0.05 MIU/L-ACNC: 2 UIU/ML (ref 0.36–3.74)
UROBILINOGEN UR QL STRIP.AUTO: 1 EU/DL (ref 0.2–1)
VLDLC SERPL CALC-MCNC: 10 MG/DL
WBC # BLD AUTO: 3.5 K/UL (ref 4.6–13.2)

## 2018-12-13 PROCEDURE — 80076 HEPATIC FUNCTION PANEL: CPT

## 2018-12-13 PROCEDURE — 82306 VITAMIN D 25 HYDROXY: CPT

## 2018-12-13 PROCEDURE — 84439 ASSAY OF FREE THYROXINE: CPT

## 2018-12-13 PROCEDURE — 81003 URINALYSIS AUTO W/O SCOPE: CPT

## 2018-12-13 PROCEDURE — 84443 ASSAY THYROID STIM HORMONE: CPT

## 2018-12-13 PROCEDURE — 80048 BASIC METABOLIC PNL TOTAL CA: CPT

## 2018-12-13 PROCEDURE — 85025 COMPLETE CBC W/AUTO DIFF WBC: CPT

## 2018-12-13 PROCEDURE — 80061 LIPID PANEL: CPT

## 2018-12-13 PROCEDURE — 36415 COLL VENOUS BLD VENIPUNCTURE: CPT

## 2018-12-14 LAB — 25(OH)D3 SERPL-MCNC: 22.5 NG/ML (ref 30–100)

## 2019-04-09 ENCOUNTER — TELEPHONE (OUTPATIENT)
Dept: FAMILY MEDICINE CLINIC | Age: 69
End: 2019-04-09

## 2019-04-10 RX ORDER — ALENDRONATE SODIUM 70 MG/1
70 TABLET ORAL
Qty: 12 TAB | Refills: 2 | Status: SHIPPED | OUTPATIENT
Start: 2019-04-10 | End: 2019-12-09 | Stop reason: SDUPTHER

## 2019-04-10 NOTE — TELEPHONE ENCOUNTER
Please notify pt that he DEXA shows osteoporosis. With this, I recommend we treat with Fosamax 70 mg q week. Will continue this for 2 years, then repeat DEXA to see how she is doing. Does she agree?

## 2019-04-15 DIAGNOSIS — Z78.0 POSTMENOPAUSAL: ICD-10-CM

## 2019-04-15 DIAGNOSIS — Z12.31 ENCOUNTER FOR SCREENING MAMMOGRAM FOR MALIGNANT NEOPLASM OF BREAST: ICD-10-CM

## 2019-05-29 ENCOUNTER — OFFICE VISIT (OUTPATIENT)
Dept: FAMILY MEDICINE CLINIC | Age: 69
End: 2019-05-29

## 2019-05-29 ENCOUNTER — HOSPITAL ENCOUNTER (OUTPATIENT)
Dept: LAB | Age: 69
Discharge: HOME | End: 2019-05-29
Payer: MEDICARE

## 2019-05-29 VITALS
DIASTOLIC BLOOD PRESSURE: 70 MMHG | TEMPERATURE: 97.5 F | BODY MASS INDEX: 25.39 KG/M2 | OXYGEN SATURATION: 96 % | SYSTOLIC BLOOD PRESSURE: 122 MMHG | HEART RATE: 60 BPM | RESPIRATION RATE: 16 BRPM | WEIGHT: 158 LBS | HEIGHT: 66 IN

## 2019-05-29 DIAGNOSIS — N30.00 ACUTE CYSTITIS WITHOUT HEMATURIA: ICD-10-CM

## 2019-05-29 DIAGNOSIS — N30.00 ACUTE CYSTITIS WITHOUT HEMATURIA: Primary | ICD-10-CM

## 2019-05-29 DIAGNOSIS — G20 PARKINSON DISEASE (HCC): ICD-10-CM

## 2019-05-29 DIAGNOSIS — N39.41 URGE INCONTINENCE OF URINE: ICD-10-CM

## 2019-05-29 DIAGNOSIS — R30.0 DYSURIA: ICD-10-CM

## 2019-05-29 LAB
BILIRUB UR QL STRIP: NEGATIVE
GLUCOSE UR-MCNC: NEGATIVE MG/DL
KETONES P FAST UR STRIP-MCNC: NEGATIVE MG/DL
PH UR STRIP: 6 [PH] (ref 4.6–8)
PROT UR QL STRIP: NEGATIVE
SP GR UR STRIP: 1.03 (ref 1–1.03)
UA UROBILINOGEN AMB POC: NORMAL (ref 0.2–1)
URINALYSIS CLARITY POC: CLEAR
URINALYSIS COLOR POC: YELLOW
URINE BLOOD POC: NEGATIVE
URINE LEUKOCYTES POC: NEGATIVE
URINE NITRITES POC: NEGATIVE

## 2019-05-29 PROCEDURE — 87186 SC STD MICRODIL/AGAR DIL: CPT

## 2019-05-29 PROCEDURE — 87077 CULTURE AEROBIC IDENTIFY: CPT

## 2019-05-29 PROCEDURE — 87086 URINE CULTURE/COLONY COUNT: CPT

## 2019-05-29 RX ORDER — NITROFURANTOIN 25; 75 MG/1; MG/1
100 CAPSULE ORAL 2 TIMES DAILY
Qty: 14 CAP | Refills: 0 | Status: SHIPPED | OUTPATIENT
Start: 2019-05-29 | End: 2019-06-05

## 2019-05-29 RX ORDER — GLUCOSAMINE SULFATE 1500 MG
POWDER IN PACKET (EA) ORAL DAILY
COMMUNITY

## 2019-05-29 NOTE — PATIENT INSTRUCTIONS
Urge Incontinence in Women: Care Instructions Your Care Instructions Urge incontinence occurs when the need to urinate is so strong that you cannot reach the toilet in time, even when your bladder contains only a small amount of urine. This is also called overactive bladder or unstable bladder. Some women may have no warning before they leak urine. This condition does not cause major health problems. But it can be embarrassing and can affect a woman's self-esteem and confidence. Treatment can cure or improve your symptoms. Follow-up care is a key part of your treatment and safety. Be sure to make and go to all appointments, and call your doctor if you are having problems. It's also a good idea to know your test results and keep a list of the medicines you take. How can you care for yourself at home? · Be safe with medicines. Take your medicines exactly as prescribed. Call your doctor if you think you are having a problem with your medicine. You will get more details on the specific medicines your doctor prescribes. · Limit caffeine and alcohol. They stimulate urine production. · Urinate every 2 to 4 hours during waking hours, even if you feel that you do not have to go. · Do pelvic floor (Kegel) exercises, which tighten and strengthen pelvic muscles. To do Kegel exercises: 
? Squeeze the same muscles you would use to stop your urine. Your belly and thighs should not move. ? Hold the squeeze for 3 seconds, then relax for 3 seconds. ? Start with 3 seconds. Then add 1 second each week until you are able to squeeze for 10 seconds. ? Repeat the exercise 10 to 15 times for each session. Do three or more sessions each day. · Try wearing pads that absorb the leaks. · Keep skin in the genital area dry. When should you call for help? Call your doctor now or seek immediate medical care if: 
  · You have new urinary symptoms.  These may include leaking urine, having pain when urinating, or feeling like you need to urinate often.  
 Watch closely for changes in your health, and be sure to contact your doctor if: 
  · You do not get better as expected. Where can you learn more? Go to http://heidy-daniel.info/. Enter X006 in the search box to learn more about \"Urge Incontinence in Women: Care Instructions. \" Current as of: May 14, 2018 Content Version: 11.9 © 8764-3677 [employer]. Care instructions adapted under license by [email or web address] (which disclaims liability or warranty for this information). If you have questions about a medical condition or this instruction, always ask your healthcare professional. [address] any warranty or liability for your use of this information.

## 2019-05-29 NOTE — PROGRESS NOTES
Assessment/Plan:    *Diagnoses and all orders for this visit:    1. Acute cystitis without hematuria  -     nitrofurantoin, macrocrystal-monohydrate, (MACROBID) 100 mg capsule; Take 1 Cap by mouth two (2) times a day for 7 days. -     CULTURE, URINE; Future    2. Dysuria  -     AMB POC URINALYSIS DIP STICK AUTO W/O MICRO    3. Parkinson disease ([address]    4. Urge incontinence of urine        Pt will get info on the therapist who she wants to see for her bladder and will notify me. I spent 30 minutes with patient today and > 50% of the visit was dedicated to discussing and counseling the patient on how to manage the issues we discussed. The plan was discussed with the patient. The patient verbalized understanding and is in agreement with the plan. All medication potential side effects were discussed with the patient.    -------------------------------------------------------------------------------------------------------------------        Isabel Coburn is a 79 y.o. female and presents with Urinary Burning (last couple of days ) and Enuresis (sporadic for 6 or 7 months . )         Subjective: For the last 6-8 months, dealing with urinary incontinence. Has been doing Kegel's for years. This is associated with the progression of her Parkinson disease. She now finds that things have progressed further. She knows of a therapist that works with people but she needs to get the details on this. She just completed a 10 week physical therapy course for her Parkinson disease. She has incorporated a body pillow. She has been having some burning with urination for 2-3 days, has had UTIs in past but is not always very symptomatic form it so is concerned about this. Has not reached the point of using pads or pull-ups. ROS:  Review of Systems - Negative except as above        The problem list was updated as a part of today's visit.   Patient Active Problem List   Diagnosis Code    Parkinson disease ([facility] G20    Basal cell carcinoma (BCC) of skin of nose C44.311    Hypovitaminosis D E55.9       The PSH, FH were reviewed. SH:  Social History     Tobacco Use    Smoking status: Former Smoker     Types: Cigarettes     Last attempt to quit: 1998     Years since quittin.9    Smokeless tobacco: Never Used   Substance Use Topics    Alcohol use: No    Drug use: No       Medications/Allergies:  Current Outpatient Medications on File Prior to Visit   Medication Sig Dispense Refill    cholecalciferol (VITAMIN D3) 1,000 unit cap Take  by mouth daily.  alendronate (FOSAMAX) 70 mg tablet Take 1 Tab by mouth every seven (7) days. 12 Tab 2    citalopram (CELEXA) 20 mg tablet Take  by mouth daily.  pyridostigmine (MESTINON) 60 mg tablet Take 60 mg by mouth three (3) times daily.  clonazePAM (KLONOPIN) 0.5 mg tablet Take  by mouth nightly as needed.  amantadine HCl (SYMMETREL) 100 mg capsule Take 100 mg by mouth three (3) times daily.  rOPINIRole (REQUIP) 2 mg tablet Take 4 mg by mouth four (4) times daily.  midodrine (PROAMITINE) 10 mg tablet Take  by mouth three (3) times daily.  fludrocortisone (FLORINEF) 0.1 mg tablet Take 0.2 mg by mouth daily.  ondansetron hcl (ZOFRAN) 4 mg tablet Take 4 mg by mouth every eight (8) hours as needed for Nausea.  erythromycin with ethanol (ERYGEL) 2 % topical gel Apply  to affected area as needed.  ketoconazole (NIZORAL) 2 % topical cream Apply  to affected area daily. No current facility-administered medications on file prior to visit.          Allergies   Allergen Reactions    Buspirone Rash    Wellbutrin [Bupropion Hcl] Rash and Itching         Health Maintenance:   Health Maintenance   Topic Date Due    Hepatitis C Screening  1950    DTaP/Tdap/Td series (1 - Tdap) 1973    Shingrix Vaccine Age 50> (1 of 2) 2002    Influenza Age 9 to Adult  2019    MEDICARE YEARLY EXAM  2019  GLAUCOMA SCREENING Q2Y  02/14/2020    COLONOSCOPY  10/20/2020    BREAST CANCER SCRN MAMMOGRAM  04/02/2021    Bone Densitometry (Dexa) Screening  Completed    Pneumococcal 65+ years  Completed       Objective:  Visit Vitals  /70 (BP 1 Location: Left arm, BP Patient Position: Sitting)   Pulse 60   Temp 97.5 °F (36.4 °C) (Oral)   Resp 16   Ht 5' 6\" (1.676 m)   Wt 158 lb (71.7 kg)   SpO2 96%   BMI 25.50 kg/m²          Nurses notes and VS reviewed. Physical Examination: General appearance - alert, well appearing, and in no distress        Labwork and Ancillary Studies:    CBC w/Diff  Lab Results   Component Value Date/Time    WBC 3.5 (L) 12/13/2018 11:42 AM    HGB 12.7 12/13/2018 11:42 AM    PLATELET 392 27/80/5690 11:42 AM         Basic Metabolic Profile  Lab Results   Component Value Date/Time    Sodium 142 12/13/2018 11:42 AM    Potassium 3.8 12/13/2018 11:42 AM    Chloride 107 12/13/2018 11:42 AM    CO2 29 12/13/2018 11:42 AM    Anion gap 6 12/13/2018 11:42 AM    Glucose 81 12/13/2018 11:42 AM    BUN 10 12/13/2018 11:42 AM    Creatinine 0.62 12/13/2018 11:42 AM    BUN/Creatinine ratio 16 12/13/2018 11:42 AM    GFR est AA >60 12/13/2018 11:42 AM    GFR est non-AA >60 12/13/2018 11:42 AM    Calcium 8.9 12/13/2018 11:42 AM         LFT  Lab Results   Component Value Date/Time    ALT (SGPT) 31 12/13/2018 11:42 AM    AST (SGOT) 16 12/13/2018 11:42 AM    Alk.  phosphatase 102 12/13/2018 11:42 AM    Bilirubin, direct 0.2 12/13/2018 11:42 AM    Bilirubin, total 0.8 12/13/2018 11:42 AM         Cholesterol  Lab Results   Component Value Date/Time    Cholesterol, total 170 12/13/2018 11:42 AM    HDL Cholesterol 75 (H) 12/13/2018 11:42 AM    LDL, calculated 85 12/13/2018 11:42 AM    Triglyceride 50 12/13/2018 11:42 AM    CHOL/HDL Ratio 2.3 12/13/2018 11:42 AM

## 2019-05-29 NOTE — PROGRESS NOTES
Jerald Spaulding is a 79 y.o. female (: 1950) presenting to address:    Chief Complaint   Patient presents with    Urinary Burning     last couple of days     Enuresis     sporadic for 6 or 7 months . Vitals:    19 0905   BP: 122/70   Pulse: 60   Resp: 16   Temp: 97.5 °F (36.4 °C)   TempSrc: Oral   SpO2: 96%   Weight: 158 lb (71.7 kg)   Height: 5' 6\" (1.676 m)   PainSc:   0 - No pain       Hearing/Vision:   No exam data present    Learning Assessment:     Learning Assessment 2018   PRIMARY LEARNER Patient   HIGHEST LEVEL OF EDUCATION - PRIMARY LEARNER  > 4 YEARS OF COLLEGE   BARRIERS PRIMARY LEARNER NONE   CO-LEARNER CAREGIVER No   PRIMARY LANGUAGE ENGLISH   LEARNER PREFERENCE PRIMARY READING   ANSWERED BY patient    RELATIONSHIP SELF     Depression Screening:     3 most recent PHQ Screens 2019   Little interest or pleasure in doing things Several days   Feeling down, depressed, irritable, or hopeless Several days   Total Score PHQ 2 2     Fall Risk Assessment:     Fall Risk Assessment, last 12 mths 2019   Able to walk? Yes   Fall in past 12 months? Yes   Fall with injury? Yes   Number of falls in past 12 months 1   Fall Risk Score 2     Abuse Screening:     Abuse Screening Questionnaire 2018   Do you ever feel afraid of your partner? N   Are you in a relationship with someone who physically or mentally threatens you? N   Is it safe for you to go home? Y     Coordination of Care Questionaire:   1. Have you been to the ER, urgent care clinic since your last visit? Hospitalized since your last visit? NO    2. Have you seen or consulted any other health care providers outside of the [address] [place] [address] since your last visit? Include any pap smears or colon screening. YES neuro , Derm eye Dr     Advanced Directive:   1. Do you have an Advanced Directive? NO    2. Would you like information on Advanced Directives?  NO

## 2019-06-02 LAB
BACTERIA SPEC CULT: ABNORMAL
BACTERIA SPEC CULT: ABNORMAL
SERVICE CMNT-IMP: ABNORMAL

## 2019-10-16 ENCOUNTER — HOSPITAL ENCOUNTER (OUTPATIENT)
Dept: LAB | Age: 69
Discharge: HOME | End: 2019-10-16
Payer: MEDICARE

## 2019-10-16 ENCOUNTER — OFFICE VISIT (OUTPATIENT)
Dept: FAMILY MEDICINE CLINIC | Age: 69
End: 2019-10-16

## 2019-10-16 VITALS
OXYGEN SATURATION: 98 % | DIASTOLIC BLOOD PRESSURE: 66 MMHG | SYSTOLIC BLOOD PRESSURE: 120 MMHG | BODY MASS INDEX: 23.7 KG/M2 | TEMPERATURE: 96.9 F | HEIGHT: 69 IN | WEIGHT: 160 LBS | HEART RATE: 60 BPM

## 2019-10-16 DIAGNOSIS — R30.0 DYSURIA: Primary | ICD-10-CM

## 2019-10-16 DIAGNOSIS — R30.0 DYSURIA: ICD-10-CM

## 2019-10-16 DIAGNOSIS — M79.672 LEFT FOOT PAIN: ICD-10-CM

## 2019-10-16 LAB
BILIRUB UR QL STRIP: NEGATIVE
GLUCOSE UR-MCNC: NEGATIVE MG/DL
KETONES P FAST UR STRIP-MCNC: NEGATIVE MG/DL
PH UR STRIP: 6 [PH] (ref 4.6–8)
PROT UR QL STRIP: NEGATIVE
SP GR UR STRIP: 1.03 (ref 1–1.03)
UA UROBILINOGEN AMB POC: NORMAL (ref 0.2–1)
URINALYSIS CLARITY POC: NORMAL
URINALYSIS COLOR POC: YELLOW
URINE BLOOD POC: NEGATIVE
URINE LEUKOCYTES POC: NEGATIVE
URINE NITRITES POC: NEGATIVE

## 2019-10-16 PROCEDURE — 87086 URINE CULTURE/COLONY COUNT: CPT

## 2019-10-16 NOTE — PROGRESS NOTES
Bri Gutierrez is a 81 y.o. female (: 1950) presenting to address:    Chief Complaint   Patient presents with    Foot Pain     both . left foot worse .  Urinary Pain     has improved        Vitals:    10/16/19 1331   BP: 157/79   Pulse: 60   Temp: 96.9 °F (36.1 °C)   TempSrc: Oral   SpO2: 98%   Weight: 160 lb (72.6 kg)   Height: 5' 9\" (1.753 m)   PainSc:   9   PainLoc: Foot       Hearing/Vision:   No exam data present    Learning Assessment:     Learning Assessment 2018   PRIMARY LEARNER Patient   HIGHEST LEVEL OF EDUCATION - PRIMARY LEARNER  > 4 YEARS OF COLLEGE   BARRIERS PRIMARY LEARNER NONE   CO-LEARNER CAREGIVER No   PRIMARY LANGUAGE ENGLISH   LEARNER PREFERENCE PRIMARY READING   ANSWERED BY patient    RELATIONSHIP SELF     Depression Screening:     3 most recent PHQ Screens 10/16/2019   Little interest or pleasure in doing things Not at all   Feeling down, depressed, irritable, or hopeless Not at all   Total Score PHQ 2 0     Fall Risk Assessment:     Fall Risk Assessment, last 12 mths 2019   Able to walk? Yes   Fall in past 12 months? Yes   Fall with injury? Yes   Number of falls in past 12 months 1   Fall Risk Score 2     Abuse Screening:     Abuse Screening Questionnaire 2018   Do you ever feel afraid of your partner? N   Are you in a relationship with someone who physically or mentally threatens you? N   Is it safe for you to go home? Y     Coordination of Care Questionaire:   1. Have you been to the ER, urgent care clinic since your last visit? Hospitalized since your last visit? NO    2. Have you seen or consulted any other health care providers outside of the [address] [place] [address] since your last visit? Include any pap smears or colon screening. NO    Advanced Directive:   1. Do you have an Advanced Directive? NO    2. Would you like information on Advanced Directives?  NO

## 2019-10-16 NOTE — PROGRESS NOTES
Assessment/Plan:    *Diagnoses and all orders for this visit:    1. Dysuria  -     AMB POC URINALYSIS DIP STICK AUTO W/O MICRO  -     CULTURE, URINE; Future    2. Left foot pain  -     XR FOOT LT MIN 3 V; Future  -     REFERRAL TO PODIATRY        Will await results. No Abx will be started since UA is not very suggestive. The plan was discussed with the patient. The patient verbalized understanding and is in agreement with the plan. All medication potential side effects were discussed with the patient.    -------------------------------------------------------------------------------------------------------------------        Willard Villatoro is a 81 y.o. female and presents with Foot Pain (both . left foot worse . ) and Urinary Pain (has improved )         Subjective:  Pt here for a few issues. LT foot pain x 3-4 months. Pain just started one day as a mild pain and has steadily gotten worse. Is cross the top of foot, pain is constant, difficult to walk. She has had some burning on urination for a few days but it has improved. No fevers. ROS:  Review of Systems - Negative except as above        The problem list was updated as a part of today's visit. Patient Active Problem List   Diagnosis Code    Parkinson disease ([facility] [address] G20    Basal cell carcinoma (BCC) of skin of nose C44.311    Hypovitaminosis D E55.9       The PSH, FH were reviewed. SH:  Social History     Tobacco Use    Smoking status: Former Smoker     Types: Cigarettes     Last attempt to quit: 1998     Years since quittin.3    Smokeless tobacco: Never Used   Substance Use Topics    Alcohol use: No    Drug use: No       Medications/Allergies:  Current Outpatient Medications on File Prior to Visit   Medication Sig Dispense Refill    cholecalciferol (VITAMIN D3) 1,000 unit cap Take  by mouth daily.  alendronate (FOSAMAX) 70 mg tablet Take 1 Tab by mouth every seven (7) days.  12 Tab 2    citalopram (CELEXA) 20 mg tablet Take  by mouth daily.  pyridostigmine (MESTINON) 60 mg tablet Take 60 mg by mouth three (3) times daily.  clonazePAM (KLONOPIN) 0.5 mg tablet Take  by mouth nightly as needed.  amantadine HCl (SYMMETREL) 100 mg capsule Take 100 mg by mouth three (3) times daily.  rOPINIRole (REQUIP) 2 mg tablet Take 4 mg by mouth three (3) times daily.  midodrine (PROAMITINE) 10 mg tablet Take  by mouth three (3) times daily.  fludrocortisone (FLORINEF) 0.1 mg tablet Take 0.2 mg by mouth daily.  ondansetron hcl (ZOFRAN) 4 mg tablet Take 4 mg by mouth every eight (8) hours as needed for Nausea.  erythromycin with ethanol (ERYGEL) 2 % topical gel Apply  to affected area as needed.  ketoconazole (NIZORAL) 2 % topical cream Apply  to affected area daily. No current facility-administered medications on file prior to visit. Allergies   Allergen Reactions    Buspirone Rash    Wellbutrin [Bupropion Hcl] Rash and Itching         Health Maintenance:   Health Maintenance   Topic Date Due    Hepatitis C Screening  1950    DTaP/Tdap/Td series (1 - Tdap) 09/08/1973    Shingrix Vaccine Age 50> (2 of 2) 06/25/2019    Influenza Age 5 to Adult  08/01/2019    MEDICARE YEARLY EXAM  12/13/2019    GLAUCOMA SCREENING Q2Y  02/14/2020    COLONOSCOPY  10/20/2020    BREAST CANCER SCRN MAMMOGRAM  04/02/2021    Bone Densitometry (Dexa) Screening  Completed    Pneumococcal 65+ years  Completed       Objective:  Visit Vitals  /79   Pulse 60   Temp 96.9 °F (36.1 °C) (Oral)   Ht 5' 9\" (1.753 m)   Wt 160 lb (72.6 kg)   SpO2 98%   BMI 23.63 kg/m²          Nurses notes and VS reviewed.       Physical Examination: General appearance - alert, well appearing, and in no distress  Chest - clear to auscultation, no wheezes, rales or rhonchi, symmetric air entry  Heart - normal rate, regular rhythm, normal S1, S2, no murmurs, rubs, clicks or gallops  Abdomen - soft, nontender, nondistended, no masses or organomegaly  Extremities - LT foot pain at the base of the 2nd and 3rd toes          Labwork and Ancillary Studies:    CBC w/Diff  Lab Results   Component Value Date/Time    WBC 3.5 (L) 12/13/2018 11:42 AM    HGB 12.7 12/13/2018 11:42 AM    PLATELET 340 04/93/6327 11:42 AM         Basic Metabolic Profile  Lab Results   Component Value Date/Time    Sodium 142 12/13/2018 11:42 AM    Potassium 3.8 12/13/2018 11:42 AM    Chloride 107 12/13/2018 11:42 AM    CO2 29 12/13/2018 11:42 AM    Anion gap 6 12/13/2018 11:42 AM    Glucose 81 12/13/2018 11:42 AM    BUN 10 12/13/2018 11:42 AM    Creatinine 0.62 12/13/2018 11:42 AM    BUN/Creatinine ratio 16 12/13/2018 11:42 AM    GFR est AA >60 12/13/2018 11:42 AM    GFR est non-AA >60 12/13/2018 11:42 AM    Calcium 8.9 12/13/2018 11:42 AM         LFT  Lab Results   Component Value Date/Time    ALT (SGPT) 31 12/13/2018 11:42 AM    AST (SGOT) 16 12/13/2018 11:42 AM    Alk.  phosphatase 102 12/13/2018 11:42 AM    Bilirubin, direct 0.2 12/13/2018 11:42 AM    Bilirubin, total 0.8 12/13/2018 11:42 AM         Cholesterol  Lab Results   Component Value Date/Time    Cholesterol, total 170 12/13/2018 11:42 AM    HDL Cholesterol 75 (H) 12/13/2018 11:42 AM    LDL, calculated 85 12/13/2018 11:42 AM    Triglyceride 50 12/13/2018 11:42 AM    CHOL/HDL Ratio 2.3 12/13/2018 11:42 AM

## 2019-10-18 LAB
BACTERIA SPEC CULT: ABNORMAL
BACTERIA SPEC CULT: ABNORMAL
SERVICE CMNT-IMP: ABNORMAL

## 2019-12-09 DIAGNOSIS — R68.89 OTHER GENERAL SYMPTOMS AND SIGNS: ICD-10-CM

## 2019-12-09 DIAGNOSIS — Z13.6 SCREENING FOR HYPERTENSION: ICD-10-CM

## 2019-12-09 DIAGNOSIS — E55.9 HYPOVITAMINOSIS D: Primary | ICD-10-CM

## 2020-01-07 ENCOUNTER — OFFICE VISIT (OUTPATIENT)
Dept: FAMILY MEDICINE CLINIC | Age: 70
End: 2020-01-07

## 2020-01-07 VITALS
HEIGHT: 69 IN | OXYGEN SATURATION: 96 % | DIASTOLIC BLOOD PRESSURE: 86 MMHG | SYSTOLIC BLOOD PRESSURE: 136 MMHG | HEART RATE: 65 BPM | RESPIRATION RATE: 16 BRPM | WEIGHT: 157 LBS | BODY MASS INDEX: 23.25 KG/M2 | TEMPERATURE: 95.6 F

## 2020-01-07 DIAGNOSIS — R07.81 RIB PAIN ON RIGHT SIDE: Primary | ICD-10-CM

## 2020-01-07 RX ORDER — NAPROXEN 500 MG/1
500 TABLET ORAL 2 TIMES DAILY WITH MEALS
Qty: 30 TAB | Refills: 0 | Status: SHIPPED | OUTPATIENT
Start: 2020-01-07 | End: 2020-01-30 | Stop reason: ALTCHOICE

## 2020-01-07 NOTE — PROGRESS NOTES
Assessment/Plan:    *Diagnoses and all orders for this visit:    1. Rib pain on right side  -     XR RIBS RT UNI 2 V; Future    Other orders  -     naproxen (NAPROSYN) 500 mg tablet; Take 1 Tab by mouth two (2) times daily (with meals). Pt is currently seeing PT for balance therapy and OT. She has been having some more falls lately, associated with her Parkinson disease. I advised her to tell them about her recent fall. The plan was discussed with the patient. The patient verbalized understanding and is in agreement with the plan. All medication potential side effects were discussed with the patient.    -------------------------------------------------------------------------------------------------------------------        Alpha More is a 81 y.o. female and presents with Fall (yesterday )         Subjective:  Pt here for a fall that occurred yesterday, was going up her stairs at home when she fell on her RT side. Has had terrible pain in her RT rib region. Has taken some Ibuprofen but nothing else. Review of Systems - Respiratory ROS: no cough, shortness of breath, or wheezing  Cardiovascular ROS: no chest pain or dyspnea on exertion  Gastrointestinal ROS: no abdominal pain, change in bowel habits, or black or bloody stools  Musculoskeletal ROS: negative         The problem list was updated as a part of today's visit. Patient Active Problem List   Diagnosis Code    Parkinson disease ([facility] [address] G20    Basal cell carcinoma (BCC) of skin of nose C44.311    Hypovitaminosis D E55.9       The PSH, FH were reviewed.         SH:  Social History     Tobacco Use    Smoking status: Former Smoker     Types: Cigarettes     Last attempt to quit: 1998     Years since quittin.5    Smokeless tobacco: Never Used   Substance Use Topics    Alcohol use: No    Drug use: No       Medications/Allergies:  Current Outpatient Medications on File Prior to Visit   Medication Sig Dispense Refill    alendronate (FOSAMAX) 70 mg tablet TAKE 1 TAB BY MOUTH EVERY SEVEN (7) DAYS. 12 Tab 0    cholecalciferol (VITAMIN D3) 1,000 unit cap Take  by mouth daily.  citalopram (CELEXA) 20 mg tablet Take  by mouth daily.  pyridostigmine (MESTINON) 60 mg tablet Take 30 mg by mouth three (3) times daily.  clonazePAM (KLONOPIN) 0.5 mg tablet Take  by mouth nightly as needed.  amantadine HCl (SYMMETREL) 100 mg capsule Take 100 mg by mouth three (3) times daily.  rOPINIRole (REQUIP) 2 mg tablet Take 4 mg by mouth three (3) times daily.  midodrine (PROAMITINE) 10 mg tablet Take 10 mg by mouth three (3) times daily.  fludrocortisone (FLORINEF) 0.1 mg tablet Take 0.2 mg by mouth daily.  ondansetron hcl (ZOFRAN) 4 mg tablet Take 4 mg by mouth every eight (8) hours as needed for Nausea.  erythromycin with ethanol (ERYGEL) 2 % topical gel Apply  to affected area as needed.  ketoconazole (NIZORAL) 2 % topical cream Apply  to affected area daily. No current facility-administered medications on file prior to visit. Allergies   Allergen Reactions    Buspirone Rash    Wellbutrin [Bupropion Hcl] Rash and Itching         Health Maintenance:   Health Maintenance   Topic Date Due    Hepatitis C Screening  1950    DTaP/Tdap/Td series (1 - Tdap) 09/08/1963    Shingrix Vaccine Age 50> (2 of 2) 06/25/2019    MEDICARE YEARLY EXAM  12/13/2019    GLAUCOMA SCREENING Q2Y  02/14/2020    COLONOSCOPY  10/20/2020    BREAST CANCER SCRN MAMMOGRAM  04/02/2021    Bone Densitometry (Dexa) Screening  Completed    Influenza Age 5 to Adult  Completed    Pneumococcal 65+ years  Completed       Objective:  Visit Vitals  /86 (BP 1 Location: Left arm, BP Patient Position: Sitting)   Pulse 65   Temp 95.6 °F (35.3 °C) (Oral)   Resp 16   Ht 5' 9\" (1.753 m)   Wt 157 lb (71.2 kg)   SpO2 96%   BMI 23.18 kg/m²          Nurses notes and VS reviewed.         Physical Examination: General appearance - alert, well appearing, and in no distress  Musculoskeletal - abnormal exam of right rib cage:  TTP along the lateral and posterior RT ribs      Labwork and Ancillary Studies:    CBC w/Diff  Lab Results   Component Value Date/Time    WBC 3.5 (L) 12/13/2018 11:42 AM    HGB 12.7 12/13/2018 11:42 AM    PLATELET 476 33/59/1910 11:42 AM         Basic Metabolic Profile  Lab Results   Component Value Date/Time    Sodium 142 12/13/2018 11:42 AM    Potassium 3.8 12/13/2018 11:42 AM    Chloride 107 12/13/2018 11:42 AM    CO2 29 12/13/2018 11:42 AM    Anion gap 6 12/13/2018 11:42 AM    Glucose 81 12/13/2018 11:42 AM    BUN 10 12/13/2018 11:42 AM    Creatinine 0.62 12/13/2018 11:42 AM    BUN/Creatinine ratio 16 12/13/2018 11:42 AM    GFR est AA >60 12/13/2018 11:42 AM    GFR est non-AA >60 12/13/2018 11:42 AM    Calcium 8.9 12/13/2018 11:42 AM         LFT  Lab Results   Component Value Date/Time    ALT (SGPT) 31 12/13/2018 11:42 AM    AST (SGOT) 16 12/13/2018 11:42 AM    Alk.  phosphatase 102 12/13/2018 11:42 AM    Bilirubin, direct 0.2 12/13/2018 11:42 AM    Bilirubin, total 0.8 12/13/2018 11:42 AM         Cholesterol  Lab Results   Component Value Date/Time    Cholesterol, total 170 12/13/2018 11:42 AM    HDL Cholesterol 75 (H) 12/13/2018 11:42 AM    LDL, calculated 85 12/13/2018 11:42 AM    Triglyceride 50 12/13/2018 11:42 AM    CHOL/HDL Ratio 2.3 12/13/2018 11:42 AM

## 2020-01-07 NOTE — PROGRESS NOTES
Andrzej Hernandez is a 81 y.o. female (: 1950) presenting to address:    Chief Complaint   Patient presents with    Fall     yesterday        Vitals:    20 0758   BP: 149/71   Pulse: 65   Resp: 16   Temp: 95.6 °F (35.3 °C)   TempSrc: Oral   SpO2: 96%   Weight: 157 lb (71.2 kg)   Height: 5' 9\" (1.753 m)   PainSc:   9   PainLoc: Flank       Hearing/Vision:   No exam data present    Learning Assessment:     Learning Assessment 2018   PRIMARY LEARNER Patient   HIGHEST LEVEL OF EDUCATION - PRIMARY LEARNER  > 4 YEARS OF COLLEGE   BARRIERS PRIMARY LEARNER NONE   CO-LEARNER CAREGIVER No   PRIMARY LANGUAGE ENGLISH   LEARNER PREFERENCE PRIMARY READING   ANSWERED BY patient    RELATIONSHIP SELF     Depression Screening:     3 most recent PHQ Screens 2020   Little interest or pleasure in doing things Not at all   Feeling down, depressed, irritable, or hopeless Not at all   Total Score PHQ 2 0     Fall Risk Assessment:     Fall Risk Assessment, last 12 mths 2020   Able to walk? Yes   Fall in past 12 months? Yes   Fall with injury? Yes   Number of falls in past 12 months 6   Fall Risk Score 7     Abuse Screening:     Abuse Screening Questionnaire 2018   Do you ever feel afraid of your partner? N   Are you in a relationship with someone who physically or mentally threatens you? N   Is it safe for you to go home? Y     Coordination of Care Questionaire:   1. Have you been to the ER, urgent care clinic since your last visit? Hospitalized since your last visit? NO    2. Have you seen or consulted any other health care providers outside of the [address] [place] [address] since your last visit? Include any pap smears or colon screening. NO    Advanced Directive:   1. Do you have an Advanced Directive? NO    2. Would you like information on Advanced Directives?  NO

## 2020-01-09 ENCOUNTER — OFFICE VISIT (OUTPATIENT)
Dept: FAMILY MEDICINE CLINIC | Age: 70
End: 2020-01-09

## 2020-01-09 ENCOUNTER — HOSPITAL ENCOUNTER (OUTPATIENT)
Dept: LAB | Age: 70
Discharge: HOME | End: 2020-01-09
Payer: MEDICARE

## 2020-01-09 VITALS
HEIGHT: 69 IN | BODY MASS INDEX: 23.25 KG/M2 | RESPIRATION RATE: 16 BRPM | OXYGEN SATURATION: 96 % | HEART RATE: 60 BPM | DIASTOLIC BLOOD PRESSURE: 70 MMHG | SYSTOLIC BLOOD PRESSURE: 140 MMHG | WEIGHT: 157 LBS | TEMPERATURE: 96.1 F

## 2020-01-09 DIAGNOSIS — Z00.00 MEDICARE ANNUAL WELLNESS VISIT, SUBSEQUENT: Primary | ICD-10-CM

## 2020-01-09 DIAGNOSIS — Z11.59 NEED FOR HEPATITIS C SCREENING TEST: ICD-10-CM

## 2020-01-09 DIAGNOSIS — G20 PARKINSON DISEASE (HCC): ICD-10-CM

## 2020-01-09 LAB
25(OH)D3 SERPL-MCNC: 18.5 NG/ML (ref 30–100)
ALBUMIN SERPL-MCNC: 4.3 G/DL (ref 3.4–5)
ALBUMIN/GLOB SERPL: 1.7 {RATIO} (ref 0.8–1.7)
ALP SERPL-CCNC: 97 U/L (ref 45–117)
ALT SERPL-CCNC: 20 U/L (ref 13–56)
ANION GAP SERPL CALC-SCNC: 6 MMOL/L (ref 3–18)
APPEARANCE UR: ABNORMAL
AST SERPL-CCNC: 10 U/L (ref 10–38)
BACTERIA URNS QL MICRO: ABNORMAL /HPF
BASOPHILS # BLD: 0 K/UL (ref 0–0.1)
BASOPHILS NFR BLD: 0 % (ref 0–2)
BILIRUB DIRECT SERPL-MCNC: 0.2 MG/DL (ref 0–0.2)
BILIRUB SERPL-MCNC: 0.8 MG/DL (ref 0.2–1)
BILIRUB UR QL: NEGATIVE
BUN SERPL-MCNC: 14 MG/DL (ref 7–18)
BUN/CREAT SERPL: 24 (ref 12–20)
CALCIUM SERPL-MCNC: 8.7 MG/DL (ref 8.5–10.1)
CHLORIDE SERPL-SCNC: 108 MMOL/L (ref 100–111)
CHOLEST SERPL-MCNC: 162 MG/DL
CO2 SERPL-SCNC: 29 MMOL/L (ref 21–32)
COLOR UR: ABNORMAL
CREAT SERPL-MCNC: 0.59 MG/DL (ref 0.6–1.3)
DIFFERENTIAL METHOD BLD: ABNORMAL
EOSINOPHIL # BLD: 0.1 K/UL (ref 0–0.4)
EOSINOPHIL NFR BLD: 1 % (ref 0–5)
EPITH CASTS URNS QL MICRO: ABNORMAL /LPF (ref 0–5)
ERYTHROCYTE [DISTWIDTH] IN BLOOD BY AUTOMATED COUNT: 13.7 % (ref 11.6–14.5)
GLOBULIN SER CALC-MCNC: 2.5 G/DL (ref 2–4)
GLUCOSE SERPL-MCNC: 83 MG/DL (ref 74–99)
GLUCOSE UR STRIP.AUTO-MCNC: NEGATIVE MG/DL
HCT VFR BLD AUTO: 39.4 % (ref 35–45)
HDLC SERPL-MCNC: 69 MG/DL (ref 40–60)
HDLC SERPL: 2.3 {RATIO} (ref 0–5)
HGB BLD-MCNC: 12.5 G/DL (ref 12–16)
HGB UR QL STRIP: NEGATIVE
KETONES UR QL STRIP.AUTO: NEGATIVE MG/DL
LDLC SERPL CALC-MCNC: 82 MG/DL (ref 0–100)
LEUKOCYTE ESTERASE UR QL STRIP.AUTO: ABNORMAL
LIPID PROFILE,FLP: ABNORMAL
LYMPHOCYTES # BLD: 1.2 K/UL (ref 0.9–3.6)
LYMPHOCYTES NFR BLD: 24 % (ref 21–52)
MCH RBC QN AUTO: 31.6 PG (ref 24–34)
MCHC RBC AUTO-ENTMCNC: 31.7 G/DL (ref 31–37)
MCV RBC AUTO: 99.5 FL (ref 74–97)
MONOCYTES # BLD: 0.3 K/UL (ref 0.05–1.2)
MONOCYTES NFR BLD: 6 % (ref 3–10)
NEUTS SEG # BLD: 3.4 K/UL (ref 1.8–8)
NEUTS SEG NFR BLD: 69 % (ref 40–73)
NITRITE UR QL STRIP.AUTO: NEGATIVE
PH UR STRIP: 6 [PH] (ref 5–8)
PLATELET # BLD AUTO: 182 K/UL (ref 135–420)
PMV BLD AUTO: 10.9 FL (ref 9.2–11.8)
POTASSIUM SERPL-SCNC: 3.7 MMOL/L (ref 3.5–5.5)
PROT SERPL-MCNC: 6.8 G/DL (ref 6.4–8.2)
PROT UR STRIP-MCNC: NEGATIVE MG/DL
RBC # BLD AUTO: 3.96 M/UL (ref 4.2–5.3)
RBC #/AREA URNS HPF: ABNORMAL /HPF (ref 0–5)
SODIUM SERPL-SCNC: 143 MMOL/L (ref 136–145)
SP GR UR REFRACTOMETRY: 1.03 (ref 1–1.03)
T4 FREE SERPL-MCNC: 0.9 NG/DL (ref 0.7–1.5)
TRIGL SERPL-MCNC: 55 MG/DL (ref ?–150)
TSH SERPL DL<=0.05 MIU/L-ACNC: 1.26 UIU/ML (ref 0.36–3.74)
UROBILINOGEN UR QL STRIP.AUTO: 1 EU/DL (ref 0.2–1)
VLDLC SERPL CALC-MCNC: 11 MG/DL
WBC # BLD AUTO: 5 K/UL (ref 4.6–13.2)
WBC URNS QL MICRO: ABNORMAL /HPF (ref 0–4)

## 2020-01-09 PROCEDURE — 86803 HEPATITIS C AB TEST: CPT

## 2020-01-09 PROCEDURE — 36415 COLL VENOUS BLD VENIPUNCTURE: CPT

## 2020-01-09 PROCEDURE — 80048 BASIC METABOLIC PNL TOTAL CA: CPT

## 2020-01-09 PROCEDURE — 82306 VITAMIN D 25 HYDROXY: CPT

## 2020-01-09 PROCEDURE — 84439 ASSAY OF FREE THYROXINE: CPT

## 2020-01-09 PROCEDURE — 84443 ASSAY THYROID STIM HORMONE: CPT

## 2020-01-09 PROCEDURE — 80061 LIPID PANEL: CPT

## 2020-01-09 PROCEDURE — 85025 COMPLETE CBC W/AUTO DIFF WBC: CPT

## 2020-01-09 PROCEDURE — 80076 HEPATIC FUNCTION PANEL: CPT

## 2020-01-09 PROCEDURE — 81001 URINALYSIS AUTO W/SCOPE: CPT

## 2020-01-09 NOTE — PROGRESS NOTES
This is the Subsequent Medicare Annual Wellness Exam, performed 12 months or more after the Initial AWV or the last Subsequent AWV    I have reviewed the patient's medical history in detail and updated the computerized patient record. History     Patient Active Problem List   Diagnosis Code    Parkinson disease ([facility] [address] G20    Basal cell carcinoma (BCC) of skin of nose C44.311    Hypovitaminosis D E55.9     Past Medical History:   Diagnosis Date    Basal cell carcinoma (BCC) of skin of nose 7/11/2018    Hypovitaminosis D 7/11/2018    Parkinson disease ([facility] [address] 8/13/2014      Past Surgical History:   Procedure Laterality Date    HX CYST REMOVAL  1993    HX SALPINECTOMY  Right 1995     Current Outpatient Medications   Medication Sig Dispense Refill    naproxen (NAPROSYN) 500 mg tablet Take 1 Tab by mouth two (2) times daily (with meals). 30 Tab 0    alendronate (FOSAMAX) 70 mg tablet TAKE 1 TAB BY MOUTH EVERY SEVEN (7) DAYS. 12 Tab 0    cholecalciferol (VITAMIN D3) 1,000 unit cap Take  by mouth daily.  citalopram (CELEXA) 20 mg tablet Take  by mouth daily.  pyridostigmine (MESTINON) 60 mg tablet Take 30 mg by mouth three (3) times daily.  clonazePAM (KLONOPIN) 0.5 mg tablet Take  by mouth nightly as needed.  amantadine HCl (SYMMETREL) 100 mg capsule Take 100 mg by mouth three (3) times daily.  rOPINIRole (REQUIP) 2 mg tablet Take 4 mg by mouth three (3) times daily.  midodrine (PROAMITINE) 10 mg tablet Take 10 mg by mouth three (3) times daily.  fludrocortisone (FLORINEF) 0.1 mg tablet Take 0.2 mg by mouth daily.  ondansetron hcl (ZOFRAN) 4 mg tablet Take 4 mg by mouth every eight (8) hours as needed for Nausea.  erythromycin with ethanol (ERYGEL) 2 % topical gel Apply  to affected area as needed.  ketoconazole (NIZORAL) 2 % topical cream Apply  to affected area daily.        Allergies   Allergen Reactions    Buspirone Rash    Wellbutrin [Bupropion Hcl] Rash and Itching       Family History   Adopted: Yes   Family history unknown: Yes     Social History     Tobacco Use    Smoking status: Former Smoker     Types: Cigarettes     Last attempt to quit: 1998     Years since quittin.5    Smokeless tobacco: Never Used   Substance Use Topics    Alcohol use: No       Depression Risk Factor Screening:     3 most recent PHQ Screens 2020   Little interest or pleasure in doing things Not at all   Feeling down, depressed, irritable, or hopeless Not at all   Total Score PHQ 2 0       Alcohol Risk Factor Screening:   Do you average 1 drink per night or more than 7 drinks a week:  No    On any one occasion in the past three months have you have had more than 3 drinks containing alcohol:  No      Functional Ability and Level of Safety:   Hearing: Hearing is good. Activities of Daily Living: The home contains: no safety equipment. Patient does total self care    Ambulation: with no difficulty    Fall Risk:  Fall Risk Assessment, last 12 mths 2020   Able to walk? Yes   Fall in past 12 months? Yes   Fall with injury? Yes   Number of falls in past 12 months 6   Fall Risk Score 7       Abuse Screen:  Patient is not abused    Cognitive Screening   Has your family/caregiver stated any concerns about your memory: no      Patient Care Team   Patient Care Team:  Quentin Courtney MD as PCP - General (Internal Medicine)  Quentin Courtney [facility]  Omid Meléndez MD as Physician (Neurology)    Assessment/Plan   Education and counseling provided:  Are appropriate based on today's review and evaluation    Diagnoses and all orders for this visit:    1. Medicare annual wellness visit, subsequent    2. Parkinson disease ([facility] [address]    3. Need for hepatitis C screening test  -     HEPATITIS C AB;  Future        Health Maintenance Due   Topic Date Due    Hepatitis C Screening  1950    DTaP/Tdap/Td series (1 - Tdap) 1963    Shingrix Vaccine Age 50> (2 of 2) 06/25/2019    MEDICARE YEARLY EXAM  12/13/2019    GLAUCOMA SCREENING Q2Y  02/14/2020         Parkinson disease: will be seeing Dr. Michelle Kimball for the first time tomorrow. Is on midodrine and fludrocortisone for orthostatic hypotension. She is given this by neuro and will discuss this with them. May call me about this and management if needed. The fludrocortisone causes swelling which she does not like but on midodrine alone, she reports that her BP was still low.

## 2020-01-09 NOTE — PATIENT INSTRUCTIONS
Medicare Wellness Visit, Female     The best way to live healthy is to have a lifestyle where you eat a well-balanced diet, exercise regularly, limit alcohol use, and quit all forms of tobacco/nicotine, if applicable. Regular preventive services are another way to keep healthy. Preventive services (vaccines, screening tests, monitoring & exams) can help personalize your care plan, which helps you manage your own care. Screening tests can find health problems at the earliest stages, when they are easiest to treat. [facility] follows the current, evidence-based guidelines published by the [facility] Alvarado ([facility]) when recommending preventive services for our patients. Because we follow these guidelines, sometimes recommendations change over time as research supports it. (For example, mammograms used to be recommended annually. Even though Medicare will still pay for an annual mammogram, the newer guidelines recommend a mammogram every two years for women of average risk). Of course, you and your doctor may decide to screen more often for some diseases, based on your risk and your co-morbidities (chronic disease you are already diagnosed with). Preventive services for you include:  - Medicare offers their members a free annual wellness visit, which is time for you and your primary care provider to discuss and plan for your preventive service needs. Take advantage of this benefit every year!  -All adults over the age of 72 should receive the recommended pneumonia vaccines. Current USPSTF guidelines recommend a series of two vaccines for the best pneumonia protection.   -All adults should have a flu vaccine yearly and a tetanus vaccine every 10 years.   -All adults age 48 and older should receive the shingles vaccines (series of two vaccines).       -All adults age 38-68 who are overweight should have a diabetes screening test once every three years.   -All adults born between [birth year] and 1965 should be screened once for Hepatitis C.  -Other screening tests and preventive services for persons with diabetes include: an eye exam to screen for diabetic retinopathy, a kidney function test, a foot exam, and stricter control over your cholesterol.   -Cardiovascular screening for adults with routine risk involves an electrocardiogram (ECG) at intervals determined by your doctor.   -Colorectal cancer screenings should be done for adults age 54-65 with no increased risk factors for colorectal cancer. There are a number of acceptable methods of screening for this type of cancer. Each test has its own benefits and drawbacks. Discuss with your doctor what is most appropriate for you during your annual wellness visit. The different tests include: colonoscopy (considered the best screening method), a fecal occult blood test, a fecal DNA test, and sigmoidoscopy.    -A bone mass density test is recommended when a woman turns 65 to screen for osteoporosis. This test is only recommended one time, as a screening. Some providers will use this same test as a disease monitoring tool if you already have osteoporosis. -Breast cancer screenings are recommended every other year for women of normal risk, age 54-69.  -Cervical cancer screenings for women over age 72 are only recommended with certain risk factors.      Here is a list of your current Health Maintenance items (your personalized list of preventive services) with a due date:  Health Maintenance Due   Topic Date Due    Hepatitis C Test  1950    DTaP/Tdap/Td  (1 - Tdap) 09/08/1963    Shingles Vaccine (2 of 2) 06/25/2019    Annual Well Visit  12/13/2019    Glaucoma Screening   02/14/2020

## 2020-01-09 NOTE — PROGRESS NOTES
Wenceslao Dodson is a 81 y.o. female (: 1950) presenting to address:    Chief Complaint   Patient presents with    Complete Physical       Vitals:    20 1350   BP: 165/88   Pulse: 60   Resp: 16   Temp: 96.1 °F (35.6 °C)   TempSrc: Oral   SpO2: 96%   Weight: 157 lb (71.2 kg)   Height: 5' 9\" (1.753 m)   PainSc:   8   PainLoc: Hip       Hearing/Vision:   No exam data present    Learning Assessment:     Learning Assessment 2018   PRIMARY LEARNER Patient   HIGHEST LEVEL OF EDUCATION - PRIMARY LEARNER  > 4 YEARS OF COLLEGE   BARRIERS PRIMARY LEARNER NONE   CO-LEARNER CAREGIVER No   PRIMARY LANGUAGE ENGLISH   LEARNER PREFERENCE PRIMARY READING   ANSWERED BY patient    RELATIONSHIP SELF     Depression Screening:     3 most recent PHQ Screens 2020   Little interest or pleasure in doing things Not at all   Feeling down, depressed, irritable, or hopeless Not at all   Total Score PHQ 2 0     Fall Risk Assessment:     Fall Risk Assessment, last 12 mths 2020   Able to walk? Yes   Fall in past 12 months? Yes   Fall with injury? Yes   Number of falls in past 12 months 6   Fall Risk Score 7     Abuse Screening:     Abuse Screening Questionnaire 2018   Do you ever feel afraid of your partner? N   Are you in a relationship with someone who physically or mentally threatens you? N   Is it safe for you to go home? Y     Coordination of Care Questionaire:   1. Have you been to the ER, urgent care clinic since your last visit? Hospitalized since your last visit? NO    2. Have you seen or consulted any other health care providers outside of the [address] [place] [address] since your last visit? Include any pap smears or colon screening. NO    Advanced Directive:   1. Do you have an Advanced Directive? NO    2. Would you like information on Advanced Directives?  NO

## 2020-01-10 LAB
HCV AB SER IA-ACNC: <0.02 INDEX
HCV AB SERPL QL IA: NEGATIVE
HCV COMMENT,HCGAC: NORMAL

## 2020-01-30 ENCOUNTER — OFFICE VISIT (OUTPATIENT)
Dept: FAMILY MEDICINE CLINIC | Age: 70
End: 2020-01-30

## 2020-01-30 VITALS
TEMPERATURE: 97.7 F | SYSTOLIC BLOOD PRESSURE: 135 MMHG | WEIGHT: 155 LBS | HEIGHT: 69 IN | RESPIRATION RATE: 16 BRPM | HEART RATE: 73 BPM | DIASTOLIC BLOOD PRESSURE: 81 MMHG | BODY MASS INDEX: 22.96 KG/M2 | OXYGEN SATURATION: 99 %

## 2020-01-30 DIAGNOSIS — G90.3 ORTHOSTATIC HYPOTENSION DUE TO PARKINSON'S DISEASE (HCC): Primary | ICD-10-CM

## 2020-01-30 RX ORDER — CHOLECALCIFEROL (VITAMIN D3) 125 MCG
CAPSULE ORAL
COMMUNITY

## 2020-01-30 NOTE — PROGRESS NOTES
Isabel Coburn is a 81 y.o. female (: 1950) presenting to address:    Chief Complaint   Patient presents with    Hypertension       Vitals:    20 1327   BP: 135/81   Pulse: 73   Resp: 16   Temp: 97.7 °F (36.5 °C)   TempSrc: Oral   SpO2: 99%   Weight: 155 lb (70.3 kg)   Height: 5' 9\" (1.753 m)       Hearing/Vision:   No exam data present    Learning Assessment:     Learning Assessment 2018   PRIMARY LEARNER Patient   HIGHEST LEVEL OF EDUCATION - PRIMARY LEARNER  > 4 YEARS OF COLLEGE   BARRIERS PRIMARY LEARNER NONE   CO-LEARNER CAREGIVER No   PRIMARY LANGUAGE ENGLISH   LEARNER PREFERENCE PRIMARY READING   ANSWERED BY patient    RELATIONSHIP SELF     Depression Screening:     3 most recent PHQ Screens 2020   Little interest or pleasure in doing things Not at all   Feeling down, depressed, irritable, or hopeless Not at all   Total Score PHQ 2 0     Fall Risk Assessment:     Fall Risk Assessment, last 12 mths 2020   Able to walk? Yes   Fall in past 12 months? Yes   Fall with injury? Yes   Number of falls in past 12 months 6   Fall Risk Score 7     Abuse Screening:     Abuse Screening Questionnaire 2018   Do you ever feel afraid of your partner? N   Are you in a relationship with someone who physically or mentally threatens you? N   Is it safe for you to go home? Y     Coordination of Care Questionaire:   1. Have you been to the ER, urgent care clinic since your last visit? Hospitalized since your last visit? NO    2. Have you seen or consulted any other health care providers outside of the [address] [place] [address] since your last visit? Include any pap smears or colon screening. NO    Advanced Directive:   1. Do you have an Advanced Directive? NO    2. Would you like information on Advanced Directives?  NO

## 2020-01-30 NOTE — PROGRESS NOTES
Assessment/Plan:    *Diagnoses and all orders for this visit:    1. Orthostatic hypotension due to Parkinson's disease ([facility]        Pt will continue with just the midodrine for the above issue for now. She will continue to monitor her BP at home looking for evidence of orthostatic hypotension. F/u 2 weeks. The plan was discussed with the patient. The patient verbalized understanding and is in agreement with the plan. All medication potential side effects were discussed with the patient.    -------------------------------------------------------------------------------------------------------------------        Leonela Reed is a 81 y.o. female and presents with Hypotension         Subjective:  Pt here to f/u on her orthostatic hypotension. She started seeing Dr. Rubin Pantoja. She was weaned off Florinef and just remains on midodrine TID. It has been 2 days of just this, she did noticed some drop in BP today. She feels fine at this time and BP is stable. Review of Systems - General ROS: negative         The problem list was updated as a part of today's visit. Patient Active Problem List   Diagnosis Code    Parkinson disease ([facility] G20    Basal cell carcinoma (BCC) of skin of nose C44.311    Hypovitaminosis D E55.9       The PSH, FH were reviewed. SH:  Social History     Tobacco Use    Smoking status: Former Smoker     Types: Cigarettes     Last attempt to quit: 1998     Years since quittin.5    Smokeless tobacco: Never Used   Substance Use Topics    Alcohol use: No    Drug use: No       Medications/Allergies:  Current Outpatient Medications on File Prior to Visit   Medication Sig Dispense Refill    melatonin 5 mg tablet Take  by mouth nightly.  alendronate (FOSAMAX) 70 mg tablet TAKE 1 TAB BY MOUTH EVERY SEVEN (7) DAYS. 12 Tab 0    cholecalciferol (VITAMIN D3) 1,000 unit cap Take  by mouth daily.  citalopram (CELEXA) 20 mg tablet Take  by mouth daily.       pyridostigmine (MESTINON) 60 mg tablet Take 30 mg by mouth three (3) times daily.  clonazePAM (KLONOPIN) 0.5 mg tablet Take  by mouth nightly as needed.  amantadine HCl (SYMMETREL) 100 mg capsule Take 100 mg by mouth three (3) times daily.  rOPINIRole (REQUIP) 2 mg tablet Take 4 mg by mouth three (3) times daily.  midodrine (PROAMITINE) 10 mg tablet Take 10 mg by mouth three (3) times daily.  ondansetron hcl (ZOFRAN) 4 mg tablet Take 4 mg by mouth every eight (8) hours as needed for Nausea.  erythromycin with ethanol (ERYGEL) 2 % topical gel Apply  to affected area as needed.  ketoconazole (NIZORAL) 2 % topical cream Apply  to affected area daily.  [DISCONTINUED] naproxen (NAPROSYN) 500 mg tablet Take 1 Tab by mouth two (2) times daily (with meals). 30 Tab 0    [DISCONTINUED] fludrocortisone (FLORINEF) 0.1 mg tablet Take 0.2 mg by mouth daily. No current facility-administered medications on file prior to visit. Allergies   Allergen Reactions    Buspirone Rash    Wellbutrin [Bupropion Hcl] Rash and Itching         Health Maintenance:   Health Maintenance   Topic Date Due    DTaP/Tdap/Td series (1 - Tdap) 09/08/1963    Shingrix Vaccine Age 50> (2 of 2) 06/25/2019    GLAUCOMA SCREENING Q2Y  02/14/2020    COLONOSCOPY  10/20/2020    MEDICARE YEARLY EXAM  01/09/2021    BREAST CANCER SCRN MAMMOGRAM  04/02/2021    Hepatitis C Screening  Completed    Bone Densitometry (Dexa) Screening  Completed    Influenza Age 5 to Adult  Completed    Pneumococcal 65+ years  Completed       Objective:  Visit Vitals  /81   Pulse 73   Temp 97.7 °F (36.5 °C) (Oral)   Resp 16   Ht 5' 9\" (1.753 m)   Wt 155 lb (70.3 kg)   SpO2 99%   BMI 22.89 kg/m²          Nurses notes and VS reviewed.       Physical Examination: General appearance - alert, well appearing, and in no distress  Chest - clear to auscultation, no wheezes, rales or rhonchi, symmetric air entry  Heart - normal rate, regular rhythm, normal S1, S2, no murmurs, rubs, clicks or gallops  Abdomen - soft, nontender, nondistended, no masses or organomegaly          Labwork and Ancillary Studies:    CBC w/Diff  Lab Results   Component Value Date/Time    WBC 5.0 01/09/2020 02:39 PM    HGB 12.5 01/09/2020 02:39 PM    PLATELET 732 35/67/9803 02:39 PM         Basic Metabolic Profile  Lab Results   Component Value Date/Time    Sodium 143 01/09/2020 02:39 PM    Potassium 3.7 01/09/2020 02:39 PM    Chloride 108 01/09/2020 02:39 PM    CO2 29 01/09/2020 02:39 PM    Anion gap 6 01/09/2020 02:39 PM    Glucose 83 01/09/2020 02:39 PM    BUN 14 01/09/2020 02:39 PM    Creatinine 0.59 (L) 01/09/2020 02:39 PM    BUN/Creatinine ratio 24 (H) 01/09/2020 02:39 PM    GFR est AA >60 01/09/2020 02:39 PM    GFR est non-AA >60 01/09/2020 02:39 PM    Calcium 8.7 01/09/2020 02:39 PM         LFT  Lab Results   Component Value Date/Time    ALT (SGPT) 20 01/09/2020 02:39 PM    AST (SGOT) 10 01/09/2020 02:39 PM    Alk.  phosphatase 97 01/09/2020 02:39 PM    Bilirubin, direct 0.2 01/09/2020 02:39 PM    Bilirubin, total 0.8 01/09/2020 02:39 PM         Cholesterol  Lab Results   Component Value Date/Time    Cholesterol, total 162 01/09/2020 02:39 PM    HDL Cholesterol 69 (H) 01/09/2020 02:39 PM    LDL, calculated 82 01/09/2020 02:39 PM    Triglyceride 55 01/09/2020 02:39 PM    CHOL/HDL Ratio 2.3 01/09/2020 02:39 PM

## 2020-02-13 ENCOUNTER — OFFICE VISIT (OUTPATIENT)
Dept: FAMILY MEDICINE CLINIC | Age: 70
End: 2020-02-13

## 2020-02-13 VITALS
TEMPERATURE: 97.8 F | OXYGEN SATURATION: 94 % | HEIGHT: 69 IN | WEIGHT: 154 LBS | BODY MASS INDEX: 22.81 KG/M2 | RESPIRATION RATE: 16 BRPM | HEART RATE: 64 BPM | SYSTOLIC BLOOD PRESSURE: 143 MMHG | DIASTOLIC BLOOD PRESSURE: 81 MMHG

## 2020-02-13 DIAGNOSIS — G90.3 ORTHOSTATIC HYPOTENSION DUE TO PARKINSON'S DISEASE (HCC): Primary | ICD-10-CM

## 2020-02-13 NOTE — PROGRESS NOTES
Andrzej Hernandez is a 81 y.o. female (: 1950) presenting to address:    Chief Complaint   Patient presents with    Hypotension       Vitals:    20 1324   BP: 143/81   Pulse: 64   Resp: 16   Temp: 97.8 °F (36.6 °C)   TempSrc: Oral   SpO2: 94%   Weight: 154 lb (69.9 kg)   Height: 5' 9\" (1.753 m)   PainSc:   8   PainLoc: Flank       Hearing/Vision:   No exam data present    Learning Assessment:     Learning Assessment 2018   PRIMARY LEARNER Patient   HIGHEST LEVEL OF EDUCATION - PRIMARY LEARNER  > 4 YEARS OF COLLEGE   BARRIERS PRIMARY LEARNER NONE   CO-LEARNER CAREGIVER No   PRIMARY LANGUAGE ENGLISH   LEARNER PREFERENCE PRIMARY READING   ANSWERED BY patient    RELATIONSHIP SELF     Depression Screening:     3 most recent PHQ Screens 2020   Little interest or pleasure in doing things Not at all   Feeling down, depressed, irritable, or hopeless Not at all   Total Score PHQ 2 0     Fall Risk Assessment:     Fall Risk Assessment, last 12 mths 2020   Able to walk? Yes   Fall in past 12 months? Yes   Fall with injury? Yes   Number of falls in past 12 months 6   Fall Risk Score 7     Abuse Screening:     Abuse Screening Questionnaire 2018   Do you ever feel afraid of your partner? N   Are you in a relationship with someone who physically or mentally threatens you? N   Is it safe for you to go home? Y     Coordination of Care Questionaire:   1. Have you been to the ER, urgent care clinic since your last visit? Hospitalized since your last visit? NO    2. Have you seen or consulted any other health care providers outside of the [address] [place] [address] since your last visit? Include any pap smears or colon screening. NO    Advanced Directive:   1. Do you have an Advanced Directive? NO    2. Would you like information on Advanced Directives?  NO

## 2020-02-13 NOTE — PROGRESS NOTES
Assessment/Plan:    *Diagnoses and all orders for this visit:    1. Orthostatic hypotension due to Parkinson's disease ([facility] [address]  -     REFERRAL TO CARDIOLOGY        At this point it would be best to get cards involved in managing this better. Advised her to stop the AM midodrine as her readings are always high when she wakes up. Will take 1 tab at lunch and the second at 8pm.    The plan was discussed with the patient. The patient verbalized understanding and is in agreement with the plan. All medication potential side effects were discussed with the patient.    -------------------------------------------------------------------------------------------------------------------        Lacey Castrejon is a 81 y.o. female and presents with Hypotension         Subjective:  Pt here with her  for f/u. She has issues with orthostatic hypotension assoc with ehr Parkinson disease. In years past, this was managed by Julita Bryant, her neurologist.  He had her on Florinef and midodrine. Since our last visit, she has only been on midodrine TID because she was experiencing high BP readings on a persistent basis. She comes with her readings now and she still has high readings. But at the same time, readings can drop down suddenly and be quite low so it has become quite challenging. She now see Dr. Ivan Nolasco (neuro) as Dr. Julita Nobles retired but Dr. Ivan Nolasco is not comfortable addressing the hypotension. Review of Systems - General ROS: negative         The problem list was updated as a part of today's visit. Patient Active Problem List   Diagnosis Code    Parkinson disease ([facility] G20    Basal cell carcinoma (BCC) of skin of nose C44.311    Hypovitaminosis D E55.9       The PSH, FH were reviewed.         SH:  Social History     Tobacco Use    Smoking status: Former Smoker     Types: Cigarettes     Last attempt to quit: 1998     Years since quittin.6    Smokeless tobacco: Never Used   Substance Use Topics    Alcohol use: No    Drug use: No       Medications/Allergies:  Current Outpatient Medications on File Prior to Visit   Medication Sig Dispense Refill    melatonin 5 mg tablet Take  by mouth nightly.  alendronate (FOSAMAX) 70 mg tablet TAKE 1 TAB BY MOUTH EVERY SEVEN (7) DAYS. 12 Tab 0    cholecalciferol (VITAMIN D3) 1,000 unit cap Take  by mouth daily.  citalopram (CELEXA) 20 mg tablet Take 10 mg by mouth daily.  pyridostigmine (MESTINON) 60 mg tablet Take 30 mg by mouth three (3) times daily.  clonazePAM (KLONOPIN) 0.5 mg tablet Take  by mouth nightly as needed.  rOPINIRole (REQUIP) 2 mg tablet Take 4 mg by mouth three (3) times daily.  midodrine (PROAMITINE) 10 mg tablet Take 10 mg by mouth three (3) times daily.  erythromycin with ethanol (ERYGEL) 2 % topical gel Apply  to affected area as needed.  ketoconazole (NIZORAL) 2 % topical cream Apply  to affected area daily.  amantadine HCl (SYMMETREL) 100 mg capsule Take 100 mg by mouth three (3) times daily.  ondansetron hcl (ZOFRAN) 4 mg tablet Take 4 mg by mouth every eight (8) hours as needed for Nausea. No current facility-administered medications on file prior to visit.          Allergies   Allergen Reactions    Buspirone Rash    Wellbutrin [Bupropion Hcl] Rash and Itching         Health Maintenance:   Health Maintenance   Topic Date Due    DTaP/Tdap/Td series (1 - Tdap) 09/08/1963    Shingrix Vaccine Age 50> (2 of 2) 06/25/2019    GLAUCOMA SCREENING Q2Y  02/14/2020    Colonoscopy  10/20/2020    Medicare Yearly Exam  01/09/2021    Breast Cancer Screen Mammogram  04/02/2021    Lipid Screen  01/09/2025    Hepatitis C Screening  Completed    Bone Densitometry (Dexa) Screening  Completed    Influenza Age 5 to Adult  Completed    Pneumococcal 65+ years  Completed       Objective:  Visit Vitals  /81   Pulse 64   Temp 97.8 °F (36.6 °C) (Oral)   Resp 16   Ht 5' 9\" (1.753 m)   Wt 154 lb (69.9 kg) SpO2 94%   BMI 22.74 kg/m²          Nurses notes and VS reviewed. Physical Examination: General appearance - alert, well appearing, and in no distress  Chest - clear to auscultation, no wheezes, rales or rhonchi, symmetric air entry  Heart - normal rate, regular rhythm, normal S1, S2, no murmurs, rubs, clicks or gallops          Labwork and Ancillary Studies:    CBC w/Diff  Lab Results   Component Value Date/Time    WBC 5.0 01/09/2020 02:39 PM    HGB 12.5 01/09/2020 02:39 PM    PLATELET 721 90/81/2773 02:39 PM         Basic Metabolic Profile  Lab Results   Component Value Date/Time    Sodium 143 01/09/2020 02:39 PM    Potassium 3.7 01/09/2020 02:39 PM    Chloride 108 01/09/2020 02:39 PM    CO2 29 01/09/2020 02:39 PM    Anion gap 6 01/09/2020 02:39 PM    Glucose 83 01/09/2020 02:39 PM    BUN 14 01/09/2020 02:39 PM    Creatinine 0.59 (L) 01/09/2020 02:39 PM    BUN/Creatinine ratio 24 (H) 01/09/2020 02:39 PM    GFR est AA >60 01/09/2020 02:39 PM    GFR est non-AA >60 01/09/2020 02:39 PM    Calcium 8.7 01/09/2020 02:39 PM         LFT  Lab Results   Component Value Date/Time    ALT (SGPT) 20 01/09/2020 02:39 PM    AST (SGOT) 10 01/09/2020 02:39 PM    Alk.  phosphatase 97 01/09/2020 02:39 PM    Bilirubin, direct 0.2 01/09/2020 02:39 PM    Bilirubin, total 0.8 01/09/2020 02:39 PM         Cholesterol  Lab Results   Component Value Date/Time    Cholesterol, total 162 01/09/2020 02:39 PM    HDL Cholesterol 69 (H) 01/09/2020 02:39 PM    LDL, calculated 82 01/09/2020 02:39 PM    Triglyceride 55 01/09/2020 02:39 PM    CHOL/HDL Ratio 2.3 01/09/2020 02:39 PM

## 2020-03-03 RX ORDER — ALENDRONATE SODIUM 70 MG/1
70 TABLET ORAL
Qty: 12 TAB | Refills: 0 | Status: SHIPPED | OUTPATIENT
Start: 2020-03-03 | End: 2020-05-25

## 2020-05-19 ENCOUNTER — TELEPHONE (OUTPATIENT)
Dept: FAMILY MEDICINE CLINIC | Age: 70
End: 2020-05-19

## 2020-05-19 NOTE — TELEPHONE ENCOUNTER
See pt's message below and please send a copy of the prior referral to Dr. Eri mueller. Just remove the cardiologist's name.

## 2020-05-19 NOTE — TELEPHONE ENCOUNTER
----- Message from Dixie Ac sent at 5/18/2020  3:08 PM EDT -----  Regarding: Non-Urgent Medical Question  Contact: [phone]  I want to let you know that I have contacted 's office and you were correct that he's very busy. I'm on a waiting list.  In the meantime is it ok to return to the 5 mg  TID  of the Midodrine. I didn't experience constant nausea and lightheadedness on the 5 mg. I know that you want me to see a cardiologist and I would  like to find some resolution to the BP issues. I don't feel that  there was a  positive developing Dr.- patient relationship with the previous referral.  I have already called your office for a referral to Dr Edgardo Cooper  I would certainly be receptive to seeing a different  cardiologist if you have a recommendation. .  I appreciate your assistance.

## 2020-05-28 ENCOUNTER — VIRTUAL VISIT (OUTPATIENT)
Dept: FAMILY MEDICINE CLINIC | Age: 70
End: 2020-05-28

## 2020-05-28 DIAGNOSIS — G20 PARKINSON DISEASE (HCC): ICD-10-CM

## 2020-05-28 DIAGNOSIS — G90.3 ORTHOSTATIC HYPOTENSION DUE TO PARKINSON'S DISEASE (HCC): Primary | ICD-10-CM

## 2020-05-28 DIAGNOSIS — R11.0 NAUSEA: ICD-10-CM

## 2020-05-28 RX ORDER — PROMETHAZINE HYDROCHLORIDE 25 MG/1
25 TABLET ORAL
Qty: 20 TAB | Refills: 0 | Status: SHIPPED | OUTPATIENT
Start: 2020-05-28 | End: 2020-10-27 | Stop reason: ALTCHOICE

## 2020-05-28 NOTE — PROGRESS NOTES
Marissa Carrillo is a 81 y.o. female who was seen by synchronous (real-time) audio-video technology on 5/28/2020. Consent: Marissa Carrillo, who was seen by synchronous (real-time) audio-video technology, and/or her healthcare decision maker, is aware that this patient-initiated, Telehealth encounter on 5/28/2020 is a billable service, with coverage as determined by her insurance carrier. She is aware that she may receive a bill and has provided verbal consent to proceed: Yes. Assessment & Plan:   Diagnoses and all orders for this visit:    1. Orthostatic hypotension due to Parkinson's disease ([address]    2. Nausea  -     promethazine (PHENERGAN) 25 mg tablet; Take 1 Tab by mouth every eight (8) hours as needed for Nausea. 3. Parkinson disease ([facility] [address]        Will await her visit to cards next week    712  Subjective:   Marissa Carrillo is a 81 y.o. female who was seen for Hypotension    Pt was seen on a virtual visit today. Pt still struggles with the dizziness that is associated with her hypotension. She reduced the midodrine to 5 mg TID because she could not tolerate it at 10 mg but still experiences the orthostatic changes. Her other issue is with the intermittent but persistent nausea. She can not tolerate the Zofran much as it makes her drowsy so she ends up suffering with the nausea which in turn limits her intake of food. She also mentions the dystonia from the Parkinson's and how that bothers her but she needs to discuss this with neuro, Dr. Aditya Jimenez. Prior to Admission medications    Medication Sig Start Date End Date Taking? Authorizing Provider   promethazine (PHENERGAN) 25 mg tablet Take 1 Tab by mouth every eight (8) hours as needed for Nausea. 5/28/20  Yes Laureen Olivo MD   alendronate (FOSAMAX) 70 mg tablet TAKE 1 TAB BY MOUTH EVERY SEVEN (7) DAYS. 5/25/20   Laureen Olivo MD   melatonin 5 mg tablet Take  by mouth nightly.     Provider, Historical   cholecalciferol (VITAMIN D3) 1,000 unit cap Take  by mouth daily. Provider, Historical   citalopram (CELEXA) 20 mg tablet Take 10 mg by mouth daily. Provider, Historical   pyridostigmine (MESTINON) 60 mg tablet Take 30 mg by mouth three (3) times daily. Provider, Historical   clonazePAM (KLONOPIN) 0.5 mg tablet Take  by mouth nightly as needed. Provider, Historical   amantadine HCl (SYMMETREL) 100 mg capsule Take 100 mg by mouth three (3) times daily. Provider, Historical   rOPINIRole (REQUIP) 2 mg tablet Take 4 mg by mouth three (3) times daily. Provider, Historical   midodrine (PROAMITINE) 10 mg tablet Take 10 mg by mouth three (3) times daily. Provider, Historical   ondansetron hcl (ZOFRAN) 4 mg tablet Take 4 mg by mouth every eight (8) hours as needed for Nausea. Provider, Historical   erythromycin with ethanol (ERYGEL) 2 % topical gel Apply  to affected area as needed. Provider, Historical   ketoconazole (NIZORAL) 2 % topical cream Apply  to affected area daily. Provider, Historical     Allergies   Allergen Reactions    Buspirone Rash    Wellbutrin [Bupropion Hcl] Rash and Itching       Patient Active Problem List   Diagnosis Code    Parkinson disease ([facility] G20    Basal cell carcinoma (BCC) of skin of nose C44.311    Hypovitaminosis D E55.9     Current Outpatient Medications   Medication Sig Dispense Refill    promethazine (PHENERGAN) 25 mg tablet Take 1 Tab by mouth every eight (8) hours as needed for Nausea. 20 Tab 0    alendronate (FOSAMAX) 70 mg tablet TAKE 1 TAB BY MOUTH EVERY SEVEN (7) DAYS. 12 Tab 1    melatonin 5 mg tablet Take  by mouth nightly.  cholecalciferol (VITAMIN D3) 1,000 unit cap Take  by mouth daily.  citalopram (CELEXA) 20 mg tablet Take 10 mg by mouth daily.  pyridostigmine (MESTINON) 60 mg tablet Take 30 mg by mouth three (3) times daily.  clonazePAM (KLONOPIN) 0.5 mg tablet Take  by mouth nightly as needed.       amantadine HCl (SYMMETREL) 100 mg capsule Take 100 mg by mouth three (3) times daily.  rOPINIRole (REQUIP) 2 mg tablet Take 4 mg by mouth three (3) times daily.  midodrine (PROAMITINE) 10 mg tablet Take 10 mg by mouth three (3) times daily.  ondansetron hcl (ZOFRAN) 4 mg tablet Take 4 mg by mouth every eight (8) hours as needed for Nausea.  erythromycin with ethanol (ERYGEL) 2 % topical gel Apply  to affected area as needed.  ketoconazole (NIZORAL) 2 % topical cream Apply  to affected area daily. Allergies   Allergen Reactions    Buspirone Rash    Wellbutrin [Bupropion Hcl] Rash and Itching     Past Medical History:   Diagnosis Date    Basal cell carcinoma (BCC) of skin of nose 7/11/2018    Hypovitaminosis D 7/11/2018    Parkinson disease ([facility] [facility] 8/13/2014     Past Surgical History:   Procedure Laterality Date    HX CYST REMOVAL  1993    HX SALPINECTOMY  Right 1995       Review of Systems   Gastrointestinal: Positive for nausea. Neurological: Positive for dizziness. Objective: There were no vitals taken for this visit. General: alert, cooperative, no distress   Mental  status: normal mood, behavior, speech, dress, motor activity, and thought processes, able to follow commands   HENT: NCAT   Neck: no visualized mass   Resp: no respiratory distress   Neuro: no gross deficits   Skin: no discoloration or lesions of concern on visible areas   Psychiatric: normal affect, consistent with stated mood, no evidence of hallucinations     Additional exam findings: We discussed the expected course, resolution and complications of the diagnosis(es) in detail. Medication risks, benefits, costs, interactions, and alternatives were discussed as indicated. I advised her to contact the office if her condition worsens, changes or fails to improve as anticipated. She expressed understanding with the diagnosis(es) and plan. Jeronimo Kasper is a 81 y.o. female who was evaluated by a video visit encounter for concerns as above. Patient identification was verified prior to start of the visit. A caregiver was present when appropriate. Due to this being a TeleHealth encounter (During QGYZL-73 public health emergency), evaluation of the following organ systems was limited: Vitals/Constitutional/EENT/Resp/CV/GI//MS/Neuro/Skin/Heme-Lymph-Imm. Pursuant to the emergency declaration under the [facility] waiver authority and the [employer] Act, this Virtual  Visit was conducted, with patient's (and/or legal guardian's) consent, to reduce the patient's risk of exposure to COVID-19 and provide necessary medical care. Services were provided through a video synchronous discussion virtually to substitute for in-person clinic visit. Patient and provider were located at their individual homes.       Iam Petit MD

## 2020-06-30 RX ORDER — MIDODRINE HYDROCHLORIDE 5 MG/1
TABLET ORAL 3 TIMES DAILY
COMMUNITY

## 2020-07-29 ENCOUNTER — VIRTUAL VISIT (OUTPATIENT)
Dept: FAMILY MEDICINE CLINIC | Age: 70
End: 2020-07-29

## 2020-07-29 DIAGNOSIS — Z13.6 SCREENING FOR HYPERTENSION: ICD-10-CM

## 2020-07-29 DIAGNOSIS — E55.9 HYPOVITAMINOSIS D: ICD-10-CM

## 2020-07-29 DIAGNOSIS — R68.89 OTHER GENERAL SYMPTOMS AND SIGNS: ICD-10-CM

## 2020-07-29 DIAGNOSIS — G90.3 ORTHOSTATIC HYPOTENSION DUE TO PARKINSON'S DISEASE (HCC): Primary | ICD-10-CM

## 2020-07-29 NOTE — PROGRESS NOTES
Chente Garrett is a 81 y.o. female who was seen by synchronous (real-time) audio-video technology on 7/29/2020 for No chief complaint on file. Assessment & Plan:   Diagnoses and all orders for this visit:    1. Orthostatic hypotension due to Parkinson's disease ([facility] [address]      Physical in Dec.  BW.    712  Subjective:     Pt seen for her 6 mon f/u. She had her visit with Dr. Vern Bailey. She was very pleased with her visit. He has advised her to take Midodrine as 5 mg TID with an additional pill if she feels \"bad\". He also advised her to wear compression hose, which she has not started yet. Prior to Admission medications    Medication Sig Start Date End Date Taking? Authorizing Provider   midodrine (PROAMATINE) 5 mg tablet Take  by mouth three (3) times daily. Provider, Historical   promethazine (PHENERGAN) 25 mg tablet Take 1 Tab by mouth every eight (8) hours as needed for Nausea. 5/28/20   Ailyn Juarez MD   alendronate (FOSAMAX) 70 mg tablet TAKE 1 TAB BY MOUTH EVERY SEVEN (7) DAYS. 5/25/20   Ailyn Juarez MD   melatonin 5 mg tablet Take  by mouth nightly. Provider, Historical   cholecalciferol (VITAMIN D3) 1,000 unit cap Take  by mouth daily. Provider, Historical   citalopram (CELEXA) 20 mg tablet Take 10 mg by mouth daily. Provider, Historical   pyridostigmine (MESTINON) 60 mg tablet Take 30 mg by mouth three (3) times daily. Provider, Historical   clonazePAM (KLONOPIN) 0.5 mg tablet Take  by mouth nightly as needed. Provider, Historical   amantadine HCl (SYMMETREL) 100 mg capsule Take 100 mg by mouth three (3) times daily. Provider, Historical   rOPINIRole (REQUIP) 2 mg tablet Take 4 mg by mouth three (3) times daily. Provider, Historical   ondansetron hcl (ZOFRAN) 4 mg tablet Take 4 mg by mouth every eight (8) hours as needed for Nausea. Provider, Historical   erythromycin with ethanol (ERYGEL) 2 % topical gel Apply  to affected area as needed.     Provider, Historical ketoconazole (NIZORAL) 2 % topical cream Apply  to affected area daily. Provider, Historical     Patient Active Problem List   Diagnosis Code    Parkinson disease ([facility] G20    Basal cell carcinoma (BCC) of skin of nose C44.311    Hypovitaminosis D E55.9     Current Outpatient Medications   Medication Sig Dispense Refill    midodrine (PROAMATINE) 5 mg tablet Take  by mouth three (3) times daily.  promethazine (PHENERGAN) 25 mg tablet Take 1 Tab by mouth every eight (8) hours as needed for Nausea. 20 Tab 0    alendronate (FOSAMAX) 70 mg tablet TAKE 1 TAB BY MOUTH EVERY SEVEN (7) DAYS. 12 Tab 1    melatonin 5 mg tablet Take  by mouth nightly.  cholecalciferol (VITAMIN D3) 1,000 unit cap Take  by mouth daily.  citalopram (CELEXA) 20 mg tablet Take 10 mg by mouth daily.  pyridostigmine (MESTINON) 60 mg tablet Take 30 mg by mouth three (3) times daily.  clonazePAM (KLONOPIN) 0.5 mg tablet Take  by mouth nightly as needed.  amantadine HCl (SYMMETREL) 100 mg capsule Take 100 mg by mouth three (3) times daily.  rOPINIRole (REQUIP) 2 mg tablet Take 4 mg by mouth three (3) times daily.  ondansetron hcl (ZOFRAN) 4 mg tablet Take 4 mg by mouth every eight (8) hours as needed for Nausea.  erythromycin with ethanol (ERYGEL) 2 % topical gel Apply  to affected area as needed.  ketoconazole (NIZORAL) 2 % topical cream Apply  to affected area daily.        Allergies   Allergen Reactions    Buspirone Rash    Wellbutrin [Bupropion Hcl] Rash and Itching     Past Medical History:   Diagnosis Date    Basal cell carcinoma (BCC) of skin of nose 7/11/2018    Hypovitaminosis D 7/11/2018    Parkinson disease ([facility] [address] 8/13/2014     Past Surgical History:   Procedure Laterality Date    HX CYST REMOVAL  1993    HX SALPINECTOMY  Right 1995     Family History   Adopted: Yes   Family history unknown: Yes     Social History     Tobacco Use    Smoking status: Former Smoker     Types: Cigarettes     Last attempt to quit: 1998     Years since quittin.0    Smokeless tobacco: Never Used   Substance Use Topics    Alcohol use: No       Review of Systems   All other systems reviewed and are negative. Objective:   No flowsheet data found. General: alert, cooperative, no distress   Mental  status: normal mood, behavior, speech, dress, motor activity, and thought processes, able to follow commands   HENT: NCAT   Neck: no visualized mass   Resp: no respiratory distress   Neuro: no gross deficits   Skin: no discoloration or lesions of concern on visible areas   Psychiatric: normal affect, consistent with stated mood, no evidence of hallucinations     Additional exam findings: We discussed the expected course, resolution and complications of the diagnosis(es) in detail. Medication risks, benefits, costs, interactions, and alternatives were discussed as indicated. I advised her to contact the office if her condition worsens, changes or fails to improve as anticipated. She expressed understanding with the diagnosis(es) and plan. Declan Dominguez, who was evaluated through a patient-initiated, synchronous (real-time) audio-video encounter, and/or her healthcare decision maker, is aware that it is a billable service, with coverage as determined by her insurance carrier. She provided verbal consent to proceed: Yes, and patient identification was verified. It was conducted pursuant to the emergency declaration under the [address] [place] [address], [address] [place] [address] authority and the [employer] Act. A caregiver was present when appropriate. Ability to conduct physical exam was limited. I was in the office. The patient was at home.       George Gonzales MD

## 2020-08-28 ENCOUNTER — TELEPHONE (OUTPATIENT)
Dept: FAMILY MEDICINE CLINIC | Age: 70
End: 2020-08-28

## 2020-08-28 NOTE — TELEPHONE ENCOUNTER
Pt is scheduled for a VV on 9/16/2020. I wasn't sure if her symptoms were anything to worry about. She says she has purplish streaks going through her arms and she is having pain on her right side underneath her rib cage. Please advise.

## 2020-08-31 NOTE — TELEPHONE ENCOUNTER
I called and spoke with patient . Streaks are on right arm only she is not on blood thinners and hasn't injured the arm started a few days ago denies any pain in the arm . Also has concerns about pain on her right side under ribs antacids help but doesn't want to keep taking them because she is already on so many other medications .

## 2020-09-01 ENCOUNTER — HOSPITAL ENCOUNTER (OUTPATIENT)
Dept: LAB | Age: 70
Discharge: HOME | End: 2020-09-01
Payer: MEDICARE

## 2020-09-01 ENCOUNTER — OFFICE VISIT (OUTPATIENT)
Dept: FAMILY MEDICINE CLINIC | Age: 70
End: 2020-09-01

## 2020-09-01 VITALS
DIASTOLIC BLOOD PRESSURE: 78 MMHG | HEART RATE: 60 BPM | OXYGEN SATURATION: 100 % | RESPIRATION RATE: 16 BRPM | BODY MASS INDEX: 22.81 KG/M2 | TEMPERATURE: 96.8 F | SYSTOLIC BLOOD PRESSURE: 133 MMHG | HEIGHT: 69 IN | WEIGHT: 154 LBS

## 2020-09-01 DIAGNOSIS — R10.9 RIGHT SIDED ABDOMINAL PAIN: Primary | ICD-10-CM

## 2020-09-01 DIAGNOSIS — R23.3 PETECHIAE: ICD-10-CM

## 2020-09-01 DIAGNOSIS — R10.9 RIGHT SIDED ABDOMINAL PAIN: ICD-10-CM

## 2020-09-01 LAB
ALBUMIN SERPL-MCNC: 4.1 G/DL (ref 3.4–5)
ALBUMIN/GLOB SERPL: 1.4 {RATIO} (ref 0.8–1.7)
ALP SERPL-CCNC: 97 U/L (ref 45–117)
ALT SERPL-CCNC: 26 U/L (ref 13–56)
ANION GAP SERPL CALC-SCNC: 3 MMOL/L (ref 3–18)
APPEARANCE UR: CLEAR
AST SERPL-CCNC: 18 U/L (ref 10–38)
BASOPHILS # BLD: 0 K/UL (ref 0–0.1)
BASOPHILS NFR BLD: 1 % (ref 0–2)
BILIRUB DIRECT SERPL-MCNC: 0.2 MG/DL (ref 0–0.2)
BILIRUB SERPL-MCNC: 0.8 MG/DL (ref 0.2–1)
BILIRUB UR QL: NEGATIVE
BUN SERPL-MCNC: 11 MG/DL (ref 7–18)
BUN/CREAT SERPL: 16 (ref 12–20)
CALCIUM SERPL-MCNC: 9 MG/DL (ref 8.5–10.1)
CHLORIDE SERPL-SCNC: 111 MMOL/L (ref 100–111)
CO2 SERPL-SCNC: 29 MMOL/L (ref 21–32)
COLOR UR: YELLOW
CREAT SERPL-MCNC: 0.68 MG/DL (ref 0.6–1.3)
DIFFERENTIAL METHOD BLD: ABNORMAL
EOSINOPHIL # BLD: 0.1 K/UL (ref 0–0.4)
EOSINOPHIL NFR BLD: 1 % (ref 0–5)
ERYTHROCYTE [DISTWIDTH] IN BLOOD BY AUTOMATED COUNT: 13.4 % (ref 11.6–14.5)
GLOBULIN SER CALC-MCNC: 2.9 G/DL (ref 2–4)
GLUCOSE SERPL-MCNC: 75 MG/DL (ref 74–99)
GLUCOSE UR STRIP.AUTO-MCNC: NEGATIVE MG/DL
HCT VFR BLD AUTO: 38.6 % (ref 35–45)
HGB BLD-MCNC: 12.1 G/DL (ref 12–16)
HGB UR QL STRIP: NEGATIVE
KETONES UR QL STRIP.AUTO: NEGATIVE MG/DL
LEUKOCYTE ESTERASE UR QL STRIP.AUTO: NEGATIVE
LYMPHOCYTES # BLD: 1.1 K/UL (ref 0.9–3.6)
LYMPHOCYTES NFR BLD: 23 % (ref 21–52)
MCH RBC QN AUTO: 32 PG (ref 24–34)
MCHC RBC AUTO-ENTMCNC: 31.3 G/DL (ref 31–37)
MCV RBC AUTO: 102.1 FL (ref 74–97)
MONOCYTES # BLD: 0.3 K/UL (ref 0.05–1.2)
MONOCYTES NFR BLD: 6 % (ref 3–10)
NEUTS SEG # BLD: 3.5 K/UL (ref 1.8–8)
NEUTS SEG NFR BLD: 69 % (ref 40–73)
NITRITE UR QL STRIP.AUTO: NEGATIVE
PH UR STRIP: 6.5 [PH] (ref 5–8)
PLATELET # BLD AUTO: 180 K/UL (ref 135–420)
PMV BLD AUTO: 11 FL (ref 9.2–11.8)
POTASSIUM SERPL-SCNC: 4 MMOL/L (ref 3.5–5.5)
PROT SERPL-MCNC: 7 G/DL (ref 6.4–8.2)
PROT UR STRIP-MCNC: NEGATIVE MG/DL
RBC # BLD AUTO: 3.78 M/UL (ref 4.2–5.3)
SODIUM SERPL-SCNC: 143 MMOL/L (ref 136–145)
SP GR UR REFRACTOMETRY: 1.02 (ref 1–1.03)
UROBILINOGEN UR QL STRIP.AUTO: 1 EU/DL (ref 0.2–1)
WBC # BLD AUTO: 5 K/UL (ref 4.6–13.2)

## 2020-09-01 PROCEDURE — 80048 BASIC METABOLIC PNL TOTAL CA: CPT

## 2020-09-01 PROCEDURE — 81003 URINALYSIS AUTO W/O SCOPE: CPT

## 2020-09-01 PROCEDURE — 36415 COLL VENOUS BLD VENIPUNCTURE: CPT

## 2020-09-01 PROCEDURE — 87086 URINE CULTURE/COLONY COUNT: CPT

## 2020-09-01 PROCEDURE — 85025 COMPLETE CBC W/AUTO DIFF WBC: CPT

## 2020-09-01 PROCEDURE — 80076 HEPATIC FUNCTION PANEL: CPT

## 2020-09-01 NOTE — PROGRESS NOTES
Costa Gudino is a 81 y.o. female (: 1950) presenting to address:    Chief Complaint   Patient presents with    Rib Pain     right side around to the back .  Skin Problem     right arm purplish streaks        Vitals:    20 1118   BP: 133/78   Pulse: 60   Resp: 16   Temp: 96.8 °F (36 °C)   TempSrc: Oral   SpO2: 100%   Weight: 154 lb (69.9 kg)   Height: 5' 9\" (1.753 m)   PainSc:   0 - No pain       Hearing/Vision:   No exam data present    Learning Assessment:     Learning Assessment 2018   PRIMARY LEARNER Patient   HIGHEST LEVEL OF EDUCATION - PRIMARY LEARNER  > 4 YEARS OF COLLEGE   BARRIERS PRIMARY LEARNER NONE   CO-LEARNER CAREGIVER No   PRIMARY LANGUAGE ENGLISH   LEARNER PREFERENCE PRIMARY READING   ANSWERED BY patient    RELATIONSHIP SELF     Depression Screening:     3 most recent PHQ Screens 2020   Little interest or pleasure in doing things Not at all   Feeling down, depressed, irritable, or hopeless Not at all   Total Score PHQ 2 0     Fall Risk Assessment:     Fall Risk Assessment, last 12 mths 2020   Able to walk? Yes   Fall in past 12 months? Yes   Fall with injury? Yes   Number of falls in past 12 months 6   Fall Risk Score 7     Abuse Screening:     Abuse Screening Questionnaire 2018   Do you ever feel afraid of your partner? N   Are you in a relationship with someone who physically or mentally threatens you? N   Is it safe for you to go home? Y     Coordination of Care Questionaire:   1. Have you been to the ER, urgent care clinic since your last visit? Hospitalized since your last visit? NO    2. Have you seen or consulted any other health care providers outside of the [address] [place] [address] since your last visit? Include any pap smears or colon screening. NO    Advanced Directive:   1. Do you have an Advanced Directive? NO    2. Would you like information on Advanced Directives?  NO

## 2020-09-01 NOTE — PATIENT INSTRUCTIONS
Abdominal Pain: Care Instructions  Your Care Instructions     Abdominal pain has many possible causes. Some aren't serious and get better on their own in a few days. Others need more testing and treatment. If your pain continues or gets worse, you need to be rechecked and may need more tests to find out what is wrong. You may need surgery to correct the problem. Don't ignore new symptoms, such as fever, nausea and vomiting, urination problems, pain that gets worse, and dizziness. These may be signs of a more serious problem. Your doctor may have recommended a follow-up visit in the next 8 to 12 hours. If you are not getting better, you may need more tests or treatment. The doctor has checked you carefully, but problems can develop later. If you notice any problems or new symptoms, get medical treatment right away. Follow-up care is a key part of your treatment and safety. Be sure to make and go to all appointments, and call your doctor if you are having problems. It's also a good idea to know your test results and keep a list of the medicines you take. How can you care for yourself at home? · Rest until you feel better. · To prevent dehydration, drink plenty of fluids, enough so that your urine is light yellow or clear like water. Choose water and other caffeine-free clear liquids until you feel better. If you have kidney, heart, or liver disease and have to limit fluids, talk with your doctor before you increase the amount of fluids you drink. · If your stomach is upset, eat mild foods, such as rice, dry toast or crackers, bananas, and applesauce. Try eating several small meals instead of two or three large ones. · Wait until 48 hours after all symptoms have gone away before you have spicy foods, alcohol, and drinks that contain caffeine. · Do not eat foods that are high in fat. · Avoid anti-inflammatory medicines such as aspirin, ibuprofen (Advil, Motrin), and naproxen (Aleve).  These can cause stomach upset. Talk to your doctor if you take daily aspirin for another health problem. When should you call for help? [id number] anytime you think you may need emergency care. For example, call if:  · You passed out (lost consciousness). · You pass maroon or very bloody stools. · You vomit blood or what looks like coffee grounds. · You have new, severe belly pain. Call your doctor now or seek immediate medical care if:  · Your pain gets worse, especially if it becomes focused in one area of your belly. · You have a new or higher fever. · Your stools are black and look like tar, or they have streaks of blood. · You have unexpected vaginal bleeding. · You have symptoms of a urinary tract infection. These may include:  ? Pain when you urinate. ? Urinating more often than usual.  ? Blood in your urine. · You are dizzy or lightheaded, or you feel like you may faint. Watch closely for changes in your health, and be sure to contact your doctor if:  · You are not getting better after 1 day (24 hours). Where can you learn more? Go to http://heidy-daniel.info/  Enter U179 in the search box to learn more about \"Abdominal Pain: Care Instructions. \"  Current as of: June 26, 2019               Content Version: 12.5  © 8167-0865 [employer]. Care instructions adapted under license by [employer] (which disclaims liability or warranty for this information). If you have questions about a medical condition or this instruction, always ask your healthcare professional. [place] [address] any warranty or liability for your use of this information.

## 2020-09-01 NOTE — PROGRESS NOTES
Assessment/Plan:    *Diagnoses and all orders for this visit:    1. Right sided abdominal pain  -     US ABD COMP; Future  -     URINALYSIS W/ RFLX MICROSCOPIC; Future  -     CULTURE, URINE; Future  -     CBC WITH AUTOMATED DIFF; Future  -     HEPATIC FUNCTION PANEL; Future  -     METABOLIC PANEL, BASIC; Future    2. Petechiae        The plan was discussed with the patient. The patient verbalized understanding and is in agreement with the plan. All medication potential side effects were discussed with the patient.    -------------------------------------------------------------------------------------------------------------------        Khoa Drake is a 81 y.o. female and presents with Rib Pain (right side around to the back . ) and Skin Problem (right arm purplish streaks )         Subjective:  Pt here for a couple of issues. Has some small reddish-purple spotty rash on RT arm, occurred 2 weeks ago, not getting worse just persisting. Has had an new RT sided abd pain x 2 months. Can be affected by meals, pain can reach a \"12/10\" at times that makes it hard to deal with. Pain can radiate around to the back. She has the typical issues that she has always had with her bowels but nothing new. Denies any fevers, nausea, vomiting. Review of Systems - General ROS: positive for  - RUQ abd pain, RT back pain         The problem list was updated as a part of today's visit. Patient Active Problem List   Diagnosis Code    Parkinson disease ([facility] [address] G20    Basal cell carcinoma (BCC) of skin of nose C44.311    Hypovitaminosis D E55.9       The PSH, FH were reviewed.         SH:  Social History     Tobacco Use    Smoking status: Former Smoker     Types: Cigarettes     Last attempt to quit: 1998     Years since quittin.1    Smokeless tobacco: Never Used   Substance Use Topics    Alcohol use: No    Drug use: No       Medications/Allergies:  Current Outpatient Medications on File Prior to Visit Medication Sig Dispense Refill    midodrine (PROAMATINE) 5 mg tablet Take  by mouth three (3) times daily.  promethazine (PHENERGAN) 25 mg tablet Take 1 Tab by mouth every eight (8) hours as needed for Nausea. 20 Tab 0    alendronate (FOSAMAX) 70 mg tablet TAKE 1 TAB BY MOUTH EVERY SEVEN (7) DAYS. 12 Tab 1    melatonin 5 mg tablet Take  by mouth nightly.  cholecalciferol (VITAMIN D3) 1,000 unit cap Take  by mouth daily.  citalopram (CELEXA) 20 mg tablet Take 10 mg by mouth daily.  pyridostigmine (MESTINON) 60 mg tablet Take 30 mg by mouth three (3) times daily.  clonazePAM (KLONOPIN) 0.5 mg tablet Take  by mouth nightly as needed.  amantadine HCl (SYMMETREL) 100 mg capsule Take 100 mg by mouth three (3) times daily.  rOPINIRole (REQUIP) 2 mg tablet Take 4 mg by mouth three (3) times daily.  ondansetron hcl (ZOFRAN) 4 mg tablet Take 4 mg by mouth every eight (8) hours as needed for Nausea.  [DISCONTINUED] erythromycin with ethanol (ERYGEL) 2 % topical gel Apply  to affected area as needed.  [DISCONTINUED] ketoconazole (NIZORAL) 2 % topical cream Apply  to affected area daily. No current facility-administered medications on file prior to visit.          Allergies   Allergen Reactions    Buspirone Rash    Wellbutrin [Bupropion Hcl] Rash and Itching         Health Maintenance:   Health Maintenance   Topic Date Due    DTaP/Tdap/Td series (1 - Tdap) 09/08/1973    Shingrix Vaccine Age 50> (2 of 2) 06/25/2019    GLAUCOMA SCREENING Q2Y  02/14/2020    Flu Vaccine (1) 09/01/2020    Colonoscopy  10/20/2020    Medicare Yearly Exam  01/09/2021    Breast Cancer Screen Mammogram  06/30/2022    Lipid Screen  01/09/2025    Hepatitis C Screening  Completed    Bone Densitometry (Dexa) Screening  Completed    Pneumococcal 65+ years  Completed       Objective:  Visit Vitals  /78   Pulse 60   Temp 96.8 °F (36 °C) (Oral)   Resp 16   Ht 5' 9\" (1.753 m)   Wt 154 lb (69.9 kg)   SpO2 100%   BMI 22.74 kg/m²          Nurses notes and VS reviewed. Physical Examination: General appearance - alert, well appearing, and in no distress  Abdomen - tenderness noted RUQ region  no rebound tenderness noted  Skin - petechiae          Labwork and Ancillary Studies:    CBC w/Diff  Lab Results   Component Value Date/Time    WBC 5.0 01/09/2020 02:39 PM    HGB 12.5 01/09/2020 02:39 PM    PLATELET 213 02/17/8456 02:39 PM         Basic Metabolic Profile  Lab Results   Component Value Date/Time    Sodium 143 01/09/2020 02:39 PM    Potassium 3.7 01/09/2020 02:39 PM    Chloride 108 01/09/2020 02:39 PM    CO2 29 01/09/2020 02:39 PM    Anion gap 6 01/09/2020 02:39 PM    Glucose 83 01/09/2020 02:39 PM    BUN 14 01/09/2020 02:39 PM    Creatinine 0.59 (L) 01/09/2020 02:39 PM    BUN/Creatinine ratio 24 (H) 01/09/2020 02:39 PM    GFR est AA >60 01/09/2020 02:39 PM    GFR est non-AA >60 01/09/2020 02:39 PM    Calcium 8.7 01/09/2020 02:39 PM         LFT  Lab Results   Component Value Date/Time    ALT (SGPT) 20 01/09/2020 02:39 PM    Alk.  phosphatase 97 01/09/2020 02:39 PM    Bilirubin, direct 0.2 01/09/2020 02:39 PM    Bilirubin, total 0.8 01/09/2020 02:39 PM         Cholesterol  Lab Results   Component Value Date/Time    Cholesterol, total 162 01/09/2020 02:39 PM    HDL Cholesterol 69 (H) 01/09/2020 02:39 PM    LDL, calculated 82 01/09/2020 02:39 PM    Triglyceride 55 01/09/2020 02:39 PM    CHOL/HDL Ratio 2.3 01/09/2020 02:39 PM

## 2020-09-03 LAB
BACTERIA SPEC CULT: NORMAL
CC UR VC: NORMAL
SERVICE CMNT-IMP: NORMAL

## 2020-09-28 ENCOUNTER — TELEPHONE (OUTPATIENT)
Dept: FAMILY MEDICINE CLINIC | Age: 70
End: 2020-09-28

## 2020-09-28 NOTE — TELEPHONE ENCOUNTER
Pt called stating a pair of scissors fell off the counter and cut her left foot. Pt wants to know does she need a tetanus shot?

## 2020-09-29 NOTE — TELEPHONE ENCOUNTER
Spoke to Dr Morro Flores regarding pt concern. She asked me to call and inquire about the depth of wound and see if she's had a recent tetanus. Called pt. She only has a small puncture spot on her foot, not even deep enough for a bandage. Pt cleaned the area. Pt does not remember when her last tetanus was. Per pcp, pt should go by a pharmacy today and have her tetanus shot. Pt verbalizes she is comfortable with this plan.

## 2020-10-24 RX ORDER — ALENDRONATE SODIUM 70 MG/1
70 TABLET ORAL
Qty: 12 TAB | Refills: 1 | Status: SHIPPED | OUTPATIENT
Start: 2020-10-24

## 2020-10-27 ENCOUNTER — OFFICE VISIT (OUTPATIENT)
Dept: FAMILY MEDICINE CLINIC | Age: 70
End: 2020-10-27
Payer: MEDICARE

## 2020-10-27 VITALS
BODY MASS INDEX: 22.51 KG/M2 | TEMPERATURE: 97.6 F | SYSTOLIC BLOOD PRESSURE: 130 MMHG | OXYGEN SATURATION: 98 % | HEIGHT: 69 IN | DIASTOLIC BLOOD PRESSURE: 80 MMHG | HEART RATE: 65 BPM | WEIGHT: 152 LBS | RESPIRATION RATE: 16 BRPM

## 2020-10-27 DIAGNOSIS — R10.2 PELVIC PAIN IN FEMALE: Primary | ICD-10-CM

## 2020-10-27 DIAGNOSIS — M25.551 HIP PAIN, RIGHT: ICD-10-CM

## 2020-10-27 PROCEDURE — G8427 DOCREV CUR MEDS BY ELIG CLIN: HCPCS | Performed by: INTERNAL MEDICINE

## 2020-10-27 PROCEDURE — G8432 DEP SCR NOT DOC, RNG: HCPCS | Performed by: INTERNAL MEDICINE

## 2020-10-27 PROCEDURE — 1100F PTFALLS ASSESS-DOCD GE2>/YR: CPT | Performed by: INTERNAL MEDICINE

## 2020-10-27 PROCEDURE — G8420 CALC BMI NORM PARAMETERS: HCPCS | Performed by: INTERNAL MEDICINE

## 2020-10-27 PROCEDURE — G8399 PT W/DXA RESULTS DOCUMENT: HCPCS | Performed by: INTERNAL MEDICINE

## 2020-10-27 PROCEDURE — 3288F FALL RISK ASSESSMENT DOCD: CPT | Performed by: INTERNAL MEDICINE

## 2020-10-27 PROCEDURE — 1090F PRES/ABSN URINE INCON ASSESS: CPT | Performed by: INTERNAL MEDICINE

## 2020-10-27 PROCEDURE — G0463 HOSPITAL OUTPT CLINIC VISIT: HCPCS | Performed by: INTERNAL MEDICINE

## 2020-10-27 PROCEDURE — G8536 NO DOC ELDER MAL SCRN: HCPCS | Performed by: INTERNAL MEDICINE

## 2020-10-27 PROCEDURE — 99213 OFFICE O/P EST LOW 20 MIN: CPT | Performed by: INTERNAL MEDICINE

## 2020-10-27 PROCEDURE — 3017F COLORECTAL CA SCREEN DOC REV: CPT | Performed by: INTERNAL MEDICINE

## 2020-10-27 PROCEDURE — G9899 SCRN MAM PERF RSLTS DOC: HCPCS | Performed by: INTERNAL MEDICINE

## 2020-10-27 NOTE — PROGRESS NOTES
Kylah Goode is a 78 y.o. female (: 1950) presenting to address:    Chief Complaint   Patient presents with    Abdominal Pain     discuss ultrasound . Vitals:    10/27/20 1406   BP: 130/80   Pulse: 65   Resp: 16   Temp: 97.6 °F (36.4 °C)   TempSrc: Temporal   SpO2: 98%   Weight: 152 lb (68.9 kg)   Height: 5' 9\" (1.753 m)   PainSc:   3   PainLoc: Abdomen       Hearing/Vision:   No exam data present    Learning Assessment:     Learning Assessment 2018   PRIMARY LEARNER Patient   HIGHEST LEVEL OF EDUCATION - PRIMARY LEARNER  > 4 YEARS OF COLLEGE   BARRIERS PRIMARY LEARNER NONE   CO-LEARNER CAREGIVER No   PRIMARY LANGUAGE ENGLISH   LEARNER PREFERENCE PRIMARY READING   ANSWERED BY patient    RELATIONSHIP SELF     Depression Screening:     3 most recent PHQ Screens 2020   Little interest or pleasure in doing things Not at all   Feeling down, depressed, irritable, or hopeless Not at all   Total Score PHQ 2 0     Fall Risk Assessment:     Fall Risk Assessment, last 12 mths 2020   Able to walk? Yes   Fall in past 12 months? Yes   Fall with injury? Yes   Number of falls in past 12 months 6   Fall Risk Score 7     Abuse Screening:     Abuse Screening Questionnaire 2018   Do you ever feel afraid of your partner? N   Are you in a relationship with someone who physically or mentally threatens you? N   Is it safe for you to go home? Y     Coordination of Care Questionaire:   1. Have you been to the ER, urgent care clinic since your last visit? Hospitalized since your last visit? NO    2. Have you seen or consulted any other health care providers outside of the [address] [place] [address] since your last visit? Include any pap smears or colon screening. NO    Advanced Directive:   1. Do you have an Advanced Directive? NO    2. Would you like information on Advanced Directives?  NO

## 2020-10-27 NOTE — PROGRESS NOTES
Assessment/Plan:    *Diagnoses and all orders for this visit:    1. Pelvic pain in female  -     CT PELV W WO CONT; Future    2. Hip pain, right  -     XR HIP RT W OR WO PELV 2-3 VWS; Future      Discussed with pt the possibility that her pain could be related to either her appendix or adhesions from her prior RT salpingo-oophorectomy. She understands that exploratory lap is the only way this can be determined. She is thinking about this. Will return in Dec for f/u and we will need to refer for cardiac w/u given the atherosclerosis in the abd aorta. The plan was discussed with the patient. The patient verbalized understanding and is in agreement with the plan. All medication potential side effects were discussed with the patient.    -------------------------------------------------------------------------------------------------------------------        Livan Herrera is a 78 y.o. female and presents with Abdominal Pain (discuss ultrasound . )         Subjective:  Pt seen today for this persistent RT pelvic pain. The Abd US was negative other than seeing atherosclerosis in the aorta. Her pain is daily, not influenced by movement, eating, BMs. Denies fevers. Review of Systems - General ROS: positive for  - RLQ pain         The problem list was updated as a part of today's visit. Patient Active Problem List   Diagnosis Code    Parkinson disease ([facility] [address] G20    Basal cell carcinoma (BCC) of skin of nose C44.311    Hypovitaminosis D E55.9       The PSH, FH were reviewed.         SH:  Social History     Tobacco Use    Smoking status: Former Smoker     Types: Cigarettes     Last attempt to quit: 1998     Years since quittin.3    Smokeless tobacco: Never Used   Substance Use Topics    Alcohol use: No    Drug use: No       Medications/Allergies:  Current Outpatient Medications on File Prior to Visit   Medication Sig Dispense Refill    alendronate (FOSAMAX) 70 mg tablet TAKE 1 TAB BY MOUTH EVERY SEVEN (7) DAYS. 12 Tab 1    midodrine (PROAMATINE) 5 mg tablet Take  by mouth three (3) times daily.  melatonin 5 mg tablet Take  by mouth nightly.  cholecalciferol (VITAMIN D3) 1,000 unit cap Take  by mouth daily.  citalopram (CELEXA) 20 mg tablet Take 10 mg by mouth daily.  pyridostigmine bromide 30 mg tab Take 30 mg by mouth three (3) times daily.  clonazePAM (KLONOPIN) 0.5 mg tablet Take  by mouth nightly as needed.  amantadine HCl (SYMMETREL) 100 mg capsule Take 100 mg by mouth three (3) times daily.  rOPINIRole (REQUIP) 2 mg tablet Take 3 mg by mouth three (3) times daily.  [DISCONTINUED] promethazine (PHENERGAN) 25 mg tablet Take 1 Tab by mouth every eight (8) hours as needed for Nausea. 20 Tab 0    [DISCONTINUED] ondansetron hcl (ZOFRAN) 4 mg tablet Take 4 mg by mouth every eight (8) hours as needed for Nausea. No current facility-administered medications on file prior to visit. Allergies   Allergen Reactions    Buspirone Rash    Wellbutrin [Bupropion Hcl] Rash and Itching         Health Maintenance:   Health Maintenance   Topic Date Due    DTaP/Tdap/Td series (1 - Tdap) 09/08/1973    Shingrix Vaccine Age 50> (2 of 2) 06/25/2019    GLAUCOMA SCREENING Q2Y  02/14/2020    Flu Vaccine (1) 09/01/2020    Colorectal Cancer Screening Combo  10/20/2020    Medicare Yearly Exam  01/09/2021    Breast Cancer Screen Mammogram  06/30/2022    Lipid Screen  01/09/2025    Hepatitis C Screening  Completed    Bone Densitometry (Dexa) Screening  Completed    Pneumococcal 65+ years  Completed       Objective:  Visit Vitals  /80   Pulse 65   Temp 97.6 °F (36.4 °C) (Temporal)   Resp 16   Ht 5' 9\" (1.753 m)   Wt 152 lb (68.9 kg)   SpO2 98%   BMI 22.45 kg/m²          Nurses notes and VS reviewed.       Physical Examination: General appearance - alert, well appearing, and in no distress  Chest - clear to auscultation, no wheezes, rales or rhonchi, symmetric air entry  Heart - normal rate and regular rhythm  Abdomen - tenderness noted in RLQ region, not groin          Labwork and Ancillary Studies:    CBC w/Diff  Lab Results   Component Value Date/Time    WBC 5.0 09/01/2020 12:10 PM    HGB 12.1 09/01/2020 12:10 PM    PLATELET 760 75/77/9161 12:10 PM         Basic Metabolic Profile  Lab Results   Component Value Date/Time    Sodium 143 09/01/2020 12:10 PM    Potassium 4.0 09/01/2020 12:10 PM    Chloride 111 09/01/2020 12:10 PM    CO2 29 09/01/2020 12:10 PM    Anion gap 3 09/01/2020 12:10 PM    Glucose 75 09/01/2020 12:10 PM    BUN 11 09/01/2020 12:10 PM    Creatinine 0.68 09/01/2020 12:10 PM    BUN/Creatinine ratio 16 09/01/2020 12:10 PM    GFR est AA >60 09/01/2020 12:10 PM    GFR est non-AA >60 09/01/2020 12:10 PM    Calcium 9.0 09/01/2020 12:10 PM         LFT  Lab Results   Component Value Date/Time    ALT (SGPT) 26 09/01/2020 12:10 PM    Alk.  phosphatase 97 09/01/2020 12:10 PM    Bilirubin, direct 0.2 09/01/2020 12:10 PM    Bilirubin, total 0.8 09/01/2020 12:10 PM         Cholesterol  Lab Results   Component Value Date/Time    Cholesterol, total 162 01/09/2020 02:39 PM    HDL Cholesterol 69 (H) 01/09/2020 02:39 PM    LDL, calculated 82 01/09/2020 02:39 PM    Triglyceride 55 01/09/2020 02:39 PM    CHOL/HDL Ratio 2.3 01/09/2020 02:39 PM

## 2020-10-27 NOTE — PATIENT INSTRUCTIONS
Pelvic Pain: Care Instructions  Your Care Instructions     Pelvic pain, or pain in the lower belly, can have many causes. Often pelvic pain is not serious and gets better in a few days. If your pain continues or gets worse, you may need tests and treatment. Tell your doctor about any new symptoms. These may be signs of a serious problem. Follow-up care is a key part of your treatment and safety. Be sure to make and go to all appointments, and call your doctor if you are having problems. It's also a good idea to know your test results and keep a list of the medicines you take. How can you care for yourself at home? · Rest until you feel better. Lie down, and raise your legs by placing a pillow under your knees. · Drink plenty of fluids. You may find that small, frequent sips are easier on your stomach than if you drink a lot at once. Avoid drinks with carbonation or caffeine, such as soda pop, tea, or coffee. · Try eating several small meals instead of 2 or 3 large ones. Eat mild foods, such as rice, dry toast or crackers, bananas, and applesauce. Avoid fatty and spicy foods, other fruits, and alcohol until 48 hours after your symptoms have gone away. · Take an over-the-counter pain medicine, such as acetaminophen (Tylenol), ibuprofen (Advil, Motrin), or naproxen (Aleve). Read and follow all instructions on the label. · Do not take two or more pain medicines at the same time unless the doctor told you to. Many pain medicines have acetaminophen, which is Tylenol. Too much acetaminophen (Tylenol) can be harmful. · You can put a heating pad, a warm cloth, or moist heat on your belly to relieve pain. When should you call for help? Call your doctor now or seek immediate medical care if:    · You have a new or higher fever.     · You have unusual vaginal bleeding.     · You have new or worse belly or pelvic pain.     · You have vaginal discharge that has increased in amount or smells bad.    Watch closely for changes in your health, and be sure to contact your doctor if:    · You do not get better as expected. Where can you learn more? Go to http://www.gray.com/  Enter B514 in the search box to learn more about \"Pelvic Pain: Care Instructions. \"  Current as of: November 8, 2019               Content Version: 12.6  © 3252-0193 [employer]. Care instructions adapted under license by [employer] (which disclaims liability or warranty for this information). If you have questions about a medical condition or this instruction, always ask your healthcare professional. [address] any warranty or liability for your use of this information.

## 2021-02-17 DIAGNOSIS — M25.551 HIP PAIN, RIGHT: ICD-10-CM

## 2021-02-17 DIAGNOSIS — R10.2 PELVIC PAIN IN FEMALE: ICD-10-CM

## 2021-02-17 DIAGNOSIS — R10.9 RIGHT SIDED ABDOMINAL PAIN: ICD-10-CM

## 2021-03-26 ENCOUNTER — DOCUMENTATION ONLY (OUTPATIENT)
Dept: FAMILY MEDICINE CLINIC | Age: 71
End: 2021-03-26

## 2021-03-26 NOTE — PROGRESS NOTES
I called patient to see if she is going to establish care with a new provider in our practice or is she following Dr Surjit Stubbs . She is following Dr Surjit Stubbs and will notify the pharmacy of this change . 15

## 2021-12-02 ENCOUNTER — PATIENT OUTREACH (OUTPATIENT)
Dept: CASE MANAGEMENT | Age: 71
End: 2021-12-02

## 2021-12-02 NOTE — PROGRESS NOTES
Care Transitions Initial Call    Call within 2 business days of discharge: Yes     Patient: Hortencia Arnold Patient : 1950 MRN: [id number]    Was this an external facility discharge? Yes, 2021 - 2021 Discharge Facility: [facility]. CTN Attempt to contact patient via telephone on 21 for post hospital follow up. Unable to reach. Left message on voicemail with office contact information. No Patient medical information left on message.

## 2021-12-03 ENCOUNTER — PATIENT OUTREACH (OUTPATIENT)
Dept: CASE MANAGEMENT | Age: 71
End: 2021-12-03

## 2021-12-03 NOTE — PROGRESS NOTES
Care Transitions Initial Call    Call within 2 business days of discharge: Yes     Patient: Maxime Painting Patient : 1950 MRN: [id number]    Was this an external facility discharge? Yes, 2021 - 2021 Discharge Facility: [facility]. Care Transition Nurse (CTN) contacted the patient by telephone for follow up and offer care coordination services. Verified name and  with patient as identifiers. Provided introduction to self, and explanation of the CTN role. Patient states that she is okay and she follows Dr. Abdoulaye Grant at her new office. Patient kindly declined care coordination services at this time. Pt. Thanked us for the offer. Pt. States that she has an upcoming apt. With Dr. Abdoulaye Grant. No questions, concerns and/or needs verbalized by Pt. at this time . Pt. Declined care coordinator services. This episode is closed and resolved.